# Patient Record
Sex: FEMALE | Race: WHITE | NOT HISPANIC OR LATINO | Employment: OTHER | URBAN - METROPOLITAN AREA
[De-identification: names, ages, dates, MRNs, and addresses within clinical notes are randomized per-mention and may not be internally consistent; named-entity substitution may affect disease eponyms.]

---

## 2023-09-01 ENCOUNTER — HOSPITAL ENCOUNTER (EMERGENCY)
Facility: HOSPITAL | Age: 73
Discharge: HOME | End: 2023-09-01
Attending: EMERGENCY MEDICINE | Admitting: EMERGENCY MEDICINE
Payer: COMMERCIAL

## 2023-09-01 ENCOUNTER — APPOINTMENT (OUTPATIENT)
Dept: MRI IMAGING | Facility: HOSPITAL | Age: 73
End: 2023-09-01
Attending: EMERGENCY MEDICINE
Payer: COMMERCIAL

## 2023-09-01 ENCOUNTER — APPOINTMENT (OUTPATIENT)
Dept: CT IMAGING | Facility: HOSPITAL | Age: 73
End: 2023-09-01
Attending: EMERGENCY MEDICINE
Payer: COMMERCIAL

## 2023-09-01 VITALS
TEMPERATURE: 98.3 F | WEIGHT: 108 LBS | HEIGHT: 63 IN | HEART RATE: 74 BPM | SYSTOLIC BLOOD PRESSURE: 143 MMHG | RESPIRATION RATE: 15 BRPM | DIASTOLIC BLOOD PRESSURE: 65 MMHG | BODY MASS INDEX: 19.14 KG/M2 | OXYGEN SATURATION: 98 %

## 2023-09-01 DIAGNOSIS — R68.84 JAW PAIN: ICD-10-CM

## 2023-09-01 DIAGNOSIS — R94.31 ABNORMAL QT INTERVAL PRESENT ON ELECTROCARDIOGRAM: ICD-10-CM

## 2023-09-01 DIAGNOSIS — M89.9 FRONTAL SKULL LESION: ICD-10-CM

## 2023-09-01 DIAGNOSIS — R55 NEAR SYNCOPE: ICD-10-CM

## 2023-09-01 LAB
ALBUMIN SERPL BCG-MCNC: 4.3 G/DL (ref 3.5–5.2)
ALBUMIN UR-MCNC: NEGATIVE MG/DL
ALP SERPL-CCNC: 75 U/L (ref 35–104)
ALT SERPL W P-5'-P-CCNC: 10 U/L (ref 0–50)
ANION GAP SERPL CALCULATED.3IONS-SCNC: 12 MMOL/L (ref 7–15)
APPEARANCE UR: ABNORMAL
AST SERPL W P-5'-P-CCNC: 33 U/L (ref 0–45)
BASOPHILS # BLD AUTO: 0 10E3/UL (ref 0–0.2)
BASOPHILS NFR BLD AUTO: 1 %
BILIRUB SERPL-MCNC: 0.4 MG/DL
BILIRUB UR QL STRIP: NEGATIVE
BUN SERPL-MCNC: 17.7 MG/DL (ref 8–23)
CALCIUM SERPL-MCNC: 10.1 MG/DL (ref 8.8–10.2)
CHLORIDE SERPL-SCNC: 100 MMOL/L (ref 98–107)
COLOR UR AUTO: ABNORMAL
CREAT SERPL-MCNC: 0.79 MG/DL (ref 0.51–0.95)
DEPRECATED HCO3 PLAS-SCNC: 26 MMOL/L (ref 22–29)
EOSINOPHIL # BLD AUTO: 0.1 10E3/UL (ref 0–0.7)
EOSINOPHIL NFR BLD AUTO: 2 %
ERYTHROCYTE [DISTWIDTH] IN BLOOD BY AUTOMATED COUNT: 12.4 % (ref 10–15)
GFR SERPL CREATININE-BSD FRML MDRD: 78 ML/MIN/1.73M2
GLUCOSE SERPL-MCNC: 132 MG/DL (ref 70–99)
GLUCOSE UR STRIP-MCNC: NEGATIVE MG/DL
HCT VFR BLD AUTO: 43.5 % (ref 35–47)
HGB BLD-MCNC: 14.2 G/DL (ref 11.7–15.7)
HGB UR QL STRIP: NEGATIVE
HOLD SPECIMEN: NORMAL
HYALINE CASTS: 10 /LPF
IMM GRANULOCYTES # BLD: 0 10E3/UL
IMM GRANULOCYTES NFR BLD: 0 %
KETONES UR STRIP-MCNC: NEGATIVE MG/DL
LEUKOCYTE ESTERASE UR QL STRIP: NEGATIVE
LYMPHOCYTES # BLD AUTO: 1 10E3/UL (ref 0.8–5.3)
LYMPHOCYTES NFR BLD AUTO: 15 %
MCH RBC QN AUTO: 31.1 PG (ref 26.5–33)
MCHC RBC AUTO-ENTMCNC: 32.6 G/DL (ref 31.5–36.5)
MCV RBC AUTO: 95 FL (ref 78–100)
MONOCYTES # BLD AUTO: 0.4 10E3/UL (ref 0–1.3)
MONOCYTES NFR BLD AUTO: 7 %
MUCOUS THREADS #/AREA URNS LPF: PRESENT /LPF
NEUTROPHILS # BLD AUTO: 4.9 10E3/UL (ref 1.6–8.3)
NEUTROPHILS NFR BLD AUTO: 75 %
NITRATE UR QL: NEGATIVE
NRBC # BLD AUTO: 0 10E3/UL
NRBC BLD AUTO-RTO: 0 /100
PH UR STRIP: 7.5 [PH] (ref 5–7)
PLATELET # BLD AUTO: 307 10E3/UL (ref 150–450)
POTASSIUM SERPL-SCNC: 4.4 MMOL/L (ref 3.4–5.3)
PROT SERPL-MCNC: 7 G/DL (ref 6.4–8.3)
RBC # BLD AUTO: 4.56 10E6/UL (ref 3.8–5.2)
RBC URINE: 3 /HPF
SODIUM SERPL-SCNC: 138 MMOL/L (ref 136–145)
SP GR UR STRIP: 1.02 (ref 1–1.03)
TROPONIN T SERPL HS-MCNC: 14 NG/L
TROPONIN T SERPL HS-MCNC: 17 NG/L
UROBILINOGEN UR STRIP-MCNC: <2 MG/DL
WBC # BLD AUTO: 6.4 10E3/UL (ref 4–11)
WBC URINE: 3 /HPF

## 2023-09-01 PROCEDURE — G1010 CDSM STANSON: HCPCS

## 2023-09-01 PROCEDURE — A9585 GADOBUTROL INJECTION: HCPCS | Mod: JZ | Performed by: EMERGENCY MEDICINE

## 2023-09-01 PROCEDURE — 70553 MRI BRAIN STEM W/O & W/DYE: CPT | Mod: MG

## 2023-09-01 PROCEDURE — 99285 EMERGENCY DEPT VISIT HI MDM: CPT | Mod: 25

## 2023-09-01 PROCEDURE — 70496 CT ANGIOGRAPHY HEAD: CPT | Mod: ME

## 2023-09-01 PROCEDURE — 81001 URINALYSIS AUTO W/SCOPE: CPT | Performed by: EMERGENCY MEDICINE

## 2023-09-01 PROCEDURE — 80053 COMPREHEN METABOLIC PANEL: CPT | Performed by: EMERGENCY MEDICINE

## 2023-09-01 PROCEDURE — 255N000002 HC RX 255 OP 636: Mod: JZ | Performed by: EMERGENCY MEDICINE

## 2023-09-01 PROCEDURE — 93005 ELECTROCARDIOGRAM TRACING: CPT | Performed by: STUDENT IN AN ORGANIZED HEALTH CARE EDUCATION/TRAINING PROGRAM

## 2023-09-01 PROCEDURE — 93005 ELECTROCARDIOGRAM TRACING: CPT | Performed by: EMERGENCY MEDICINE

## 2023-09-01 PROCEDURE — 70498 CT ANGIOGRAPHY NECK: CPT | Mod: ME

## 2023-09-01 PROCEDURE — 85025 COMPLETE CBC W/AUTO DIFF WBC: CPT | Performed by: EMERGENCY MEDICINE

## 2023-09-01 PROCEDURE — 36415 COLL VENOUS BLD VENIPUNCTURE: CPT | Performed by: EMERGENCY MEDICINE

## 2023-09-01 PROCEDURE — 84484 ASSAY OF TROPONIN QUANT: CPT | Performed by: EMERGENCY MEDICINE

## 2023-09-01 PROCEDURE — 250N000011 HC RX IP 250 OP 636: Mod: JZ | Performed by: EMERGENCY MEDICINE

## 2023-09-01 RX ORDER — IOPAMIDOL 755 MG/ML
75 INJECTION, SOLUTION INTRAVASCULAR ONCE
Status: COMPLETED | OUTPATIENT
Start: 2023-09-01 | End: 2023-09-01

## 2023-09-01 RX ORDER — GADOBUTROL 604.72 MG/ML
5 INJECTION INTRAVENOUS ONCE
Status: COMPLETED | OUTPATIENT
Start: 2023-09-01 | End: 2023-09-01

## 2023-09-01 RX ADMIN — IOPAMIDOL 75 ML: 755 INJECTION, SOLUTION INTRAVENOUS at 14:28

## 2023-09-01 RX ADMIN — GADOBUTROL 5 ML: 604.72 INJECTION INTRAVENOUS at 18:15

## 2023-09-01 ASSESSMENT — ACTIVITIES OF DAILY LIVING (ADL)
ADLS_ACUITY_SCORE: 35

## 2023-09-01 NOTE — DISCHARGE INSTRUCTIONS
Do not take any medications that prolong your QTc, ask pharmacist before starting any new prescription medication.  Expect a call from cardiology clinic, you need to be seen as soon as possible for further cardiology testing.  Negative heart attack workup today.  Negative CT of the head and CT of the arteries of head and neck today.    Follow-up with your Primary Care Provider regarding the incidental lesion seen on your MRI today.

## 2023-09-01 NOTE — ED NOTES
EMERGENCY DEPARTMENT SIGN OUT NOTE        ED COURSE AND MEDICAL DECISION MAKING  3:02 PM Patient was signed out to me by Dr Nikky Pickett.  7:01 PM Checked in on and updated patient. I discussed the plan for discharge with the patient, and patient is agreeable.  We discussed supportive cares at home and reasons for return to the ER including new or worsening symptoms - all questions and concerns addressed.  Patient to be discharged by RN.     In brief, Venecia Meehan is a 73 year old female who presented with jaw pain. She had finished a bike ride earlier today and 15 minutes later she began to have jaw pain, nausea and diaphoresis with significant lightheadedness. Symptoms lasted for about 30 minutes and then spontaneously resolved.     At time of sign out, disposition was pending a delta troponin and CTA head / neck imaging.    Head CT demonstrated:  1.  No acute intracranial hemorrhage, extra-axial fluid collection, or mass effect.  2.  Small probable chronic infarct in the left cerebellar hemisphere.  3.  Small non-specific hypodense lesion in the left lentiform nucleus, which may represent a dilated perivascular space or age-indeterminate infarct (potentially chronic).  4.  Brain atrophy and presumed chronic small vessel ischemic changes, as described.    CTA head with no high grade proximal arterial stenosis, aneurysm, or high flow vascular malformation identified.     CTA neck demonstrated:  1.  No high grade stenosis or occlusion of the cervical carotid or vertebral arteries.  2.  Moderate to severe focal atherosclerotic stenosis at the origin of the aberrant right subclavian artery.  3.  Subtle segmental luminal irregularity involving the V3 segment of the left vertebral artery. Slight fusiform dilation and tortuosity of the distal cervical right ICA. Non-specific findings, but possibility of fibromuscular dysplasia is not excluded.    Given head CT findings of age-indeterminate infarct, MRI brain performed  and demonstrated:  1.  Mild age-related changes without acute intracranial abnormality.  2.  Small nonspecific enhancing right frontal calvarial lesion without convincing CT correlate.    Delta troponin now normal (14, down-trending from 17).    Patient has remained asymptomatic throughout ED course and would like to discharge to home.    Patient advised to follow-up with [facility] (a referral was ordered) and her PCP. Return precautions provided. Patient stable throughout ED course.     FINAL IMPRESSION    1. Jaw pain    2. Near syncope    3. Abnormal QT interval present on electrocardiogram    4. Frontal skull lesion        ED MEDS  Medications   iopamidol (ISOVUE-370) solution 75 mL (75 mLs Intravenous $Given 9/1/23 6106)   gadobutrol (GADAVIST) injection 5 mL (5 mLs Intravenous $Given 9/1/23 8012)       LAB  Labs Ordered and Resulted from Time of ED Arrival to Time of ED Departure   COMPREHENSIVE METABOLIC PANEL - Abnormal       Result Value    Sodium 138      Potassium 4.4      Chloride 100      Carbon Dioxide (CO2) 26      Anion Gap 12      Urea Nitrogen 17.7      Creatinine 0.79      Calcium 10.1      Glucose 132 (*)     Alkaline Phosphatase 75      AST 33      ALT 10      Protein Total 7.0      Albumin 4.3      Bilirubin Total 0.4      GFR Estimate 78     TROPONIN T, HIGH SENSITIVITY - Abnormal    Troponin T, High Sensitivity 17 (*)    ROUTINE UA WITH MICROSCOPIC REFLEX TO CULTURE - Abnormal    Color Urine Light Yellow      Appearance Urine Turbid (*)     Glucose Urine Negative      Bilirubin Urine Negative      Ketones Urine Negative      Specific Gravity Urine 1.023      Blood Urine Negative      pH Urine 7.5 (*)     Protein Albumin Urine Negative      Urobilinogen Urine <2.0      Nitrite Urine Negative      Leukocyte Esterase Urine Negative      Mucus Urine Present (*)     RBC Urine 3 (*)     WBC Urine 3      Hyaline Casts Urine 10 (*)    TROPONIN T, HIGH SENSITIVITY - Normal     Troponin T, High Sensitivity 14     CBC WITH PLATELETS AND DIFFERENTIAL    WBC Count 6.4      RBC Count 4.56      Hemoglobin 14.2      Hematocrit 43.5      MCV 95      MCH 31.1      MCHC 32.6      RDW 12.4      Platelet Count 307      % Neutrophils 75      % Lymphocytes 15      % Monocytes 7      % Eosinophils 2      % Basophils 1      % Immature Granulocytes 0      NRBCs per 100 WBC 0      Absolute Neutrophils 4.9      Absolute Lymphocytes 1.0      Absolute Monocytes 0.4      Absolute Eosinophils 0.1      Absolute Basophils 0.0      Absolute Immature Granulocytes 0.0      Absolute NRBCs 0.0         EKG  Performed at 12:12:37  Sinus rhythm rate 102bpm, LAD, T waves inverted V1, biphasic in V2. No prior EKG to compare. Qtc 542ms, QS duration 80ms.     Dr. Koenig has independently reviewed and interpreted today's EKG, pending Cardiologist read    RADIOLOGY    MR Brain w/o & w Contrast   Final Result   IMPRESSION:   1.  Mild age-related changes without acute intracranial abnormality.   2.  Small nonspecific enhancing right frontal calvarial lesion without convincing CT correlate.      CTA Head Neck with Contrast   Final Result   IMPRESSION:    NONCONTRAST HEAD CT:   1.  No acute intracranial hemorrhage, extra-axial fluid collection, or mass effect.   2.  Small probable chronic infarct in the left cerebellar hemisphere.   3.  Small nonspecific hypodense lesion in the left lentiform nucleus, which may represent a dilated perivascular space or age-indeterminate infarct (potentially chronic).   4.  Brain atrophy and presumed chronic small vessel ischemic changes, as described.      HEAD CTA:    1.  No high grade proximal arterial stenosis, aneurysm, or high flow vascular malformation identified.   2.  Variant [place] anatomy as above.      NECK CTA:   1.  No high grade stenosis or occlusion of the cervical carotid or vertebral arteries.   2.  Moderate to severe focal atherosclerotic stenosis at the origin of the  aberrant right subclavian artery.   3.  Subtle segmental luminal irregularity involving the V3 segment of the left vertebral artery. Slight fusiform dilation and tortuosity of the distal cervical right ICA. These findings are nonspecific; the possibility of fibromuscular dysplasia is not    excluded.             I, Sherita Sumner, am serving as a scribe to document services personally performed by Jessica Banerjee, based on my observations and the provider's statements to me.  I, Jessica Banerjee, attest that Sherita Sumner is acting in a scribe capacity, has observed my performance of the services and has documented them in accordance with my direction.    Jessica Banerjee MD  [facility] EMERGENCY DEPARTMENT  [facility] [facility] [id number] [phone]       Jessica Banerjee MD  09/02/23 1042

## 2023-09-01 NOTE — ED TRIAGE NOTES
Patient comes to ED for evaluation after episode of bilateral jaw pain, nausea, and diaphoresis that started aprx 30 minutes ago. Patient denies pain currently. Stated had just completed a bike ride when symptoms occurred. Patient also stated she developed generalized weakness.  History of parkinson's.     Triage Assessment       Row Name 09/01/23 1210       Triage Assessment (Adult)    Airway WDL WDL       Respiratory WDL    Respiratory WDL WDL       Skin Circulation/Temperature WDL    Skin Circulation/Temperature WDL WDL       Cardiac WDL    Cardiac WDL WDL       Peripheral/Neurovascular WDL    Peripheral Neurovascular WDL WDL       Cognitive/Neuro/Behavioral WDL    Cognitive/Neuro/Behavioral WDL WDL

## 2023-09-01 NOTE — ED PROVIDER NOTES
EMERGENCY DEPARTMENT ENCOUNTER      NAME: Venecia Meehan  AGE: 73 year old female  YOB: 1950  MRN: [id number]  EVALUATION DATE & TIME: 2023 12:19 PM    PCP: No primary care provider on file.    ED PROVIDER: Nikky Pickett M.D.      Chief Complaint   Patient presents with    Jaw Pain    Nausea    diaphoresis       FINAL IMPRESSION:  1. Jaw pain    2. Near syncope    3. Abnormal QT interval present on electrocardiogram        ED COURSE & MEDICAL DECISION MAKIN:24 PM I met with the patient to gather history and to perform my initial exam. I discussed the plan for care while in the Emergency Department.         Pertinent Labs & Imaging studies reviewed. (See chart for details).    Medical Decision Making  Near syncope associated with jaw pain, nausea.  No chest pain. Rule out cardiac equivalent with troponin X 2. EKG shows prolonged Qtc at 542ms, no rx meds on board.  Rule out cervical and carotid a dissection with CT CTA head and neck.   Could be related to vasovagal vs orthostatic near syncope after exercising if workup is negative.  [facility] referral to cardiology for Qtc prolongation and near syncope.  Discharge home if negative 2nd troponin and EKG.      History:  Supplemental history from: Documented in chart, if applicable  External Record(s) reviewed: Documented in chart, if applicable.    Work Up:  Chart documentation includes differential considered and any EKGs or imaging independently interpreted by provider, where specified.  In additional to work up documented, I considered the following work up: Documented in chart, if applicable.    External consultation:  Discussion of management with another provider: Documented in chart, if applicable    Complicating factors:  Care impacted by chronic illness: N/A  Care affected by social determinants of health: N/A    Disposition considerations: Discharge. No recommendations on prescription strength medication(s). See documentation for any  additional details.        At the conclusion of the encounter I discussed the results of all of the tests and the disposition. The questions were answered. The patient or family acknowledged understanding and was agreeable with the care plan.      CRITICAL CARE:  N/A    HPI    Patient information was obtained from: the patient    Use of : N/A     Venecia Meehan is a 73 year old female who presents jaw pain, nausea, and diaphoresis.    Per Chart Review: patient has no on record medical history.    The patient finished a bike ride earlier today and 15 minutes afterwards she began to have jaw pain, nausea, cold sweats, and neck stiffness. She felt like she was going to pass out.  This lasted for about 30 minutes and are no longer present upon ED evaluation. She is uncertain if the jaw pain was only one side, thinks it was bilateral. Denies vision changes such as double or blurred vision during the episode. She notes a history of high cholesterol that she is unable to take medications for due to adverse reactions. She reports that she is normally healthy and regularly exercises, denies hx of chest pain on exertion. The patient denies vision changes and any other symptoms at this time.  Has been recently , has had increased stress due to this. Recently diagnosed with parkinson's disease in January. Recently moved to a new nursing home community.    REVIEW OF SYSTEMS  All other systems negative unless noted in HPI.    PAST MEDICAL HISTORY:  Past Medical History:   Diagnosis Date    Myopathy     Parkinson's disease (H)        PAST SURGICAL HISTORY:  No past surgical history on file.      CURRENT MEDICATIONS:    No current facility-administered medications for this encounter.     No current outpatient medications on file.         ALLERGIES:  Allergies   Allergen Reactions    Acesulfame Potassium Unknown    Additions Food Enhancer [Protein] Unknown    Bee Venom Unknown    Ciprofloxacin Unknown    Fire Ant  "Unknown    Latex Unknown    Statins Muscle Pain (Myalgia)    Sulfa Antibiotics Unknown    Penicillins Rash       FAMILY HISTORY:  No family history on file.    SOCIAL HISTORY:       VITALS:  Patient Vitals for the past 24 hrs:   BP Temp Temp src Pulse Resp SpO2 Height Weight   09/01/23 1445 (!) 143/65 -- -- 74 15 98 % -- --   09/01/23 1406 116/58 -- -- 61 24 95 % -- --   09/01/23 1336 101/55 -- -- 65 18 97 % -- --   09/01/23 1209 138/79 98.3  F (36.8  C) Oral 75 18 97 % 1.6 m (5' 3\") 49 kg (108 lb)       PHYSICAL EXAM    VITAL SIGNS: BP (!) 143/65   Pulse 74   Temp 98.3  F (36.8  C) (Oral)   Resp 15   Ht 1.6 m (5' 3\")   Wt 49 kg (108 lb)   SpO2 98%   BMI 19.13 kg/m    Physical Exam  Vitals and nursing note reviewed.   Constitutional:       General: She is not in acute distress.     Appearance: She is not toxic-appearing.   Eyes:      General: No scleral icterus.        Right eye: No discharge.         Left eye: No discharge.      Extraocular Movements: Extraocular movements intact.      Pupils: Pupils are equal, round, and reactive to light.      Comments: No nystagmus present on extraocular eye movement.   Cardiovascular:      Rate and Rhythm: Normal rate and regular rhythm.   Pulmonary:      Effort: Pulmonary effort is normal. No respiratory distress.      Breath sounds: Normal breath sounds.   Abdominal:      General: There is no distension.      Palpations: Abdomen is soft.      Tenderness: There is no abdominal tenderness.   Musculoskeletal:         General: No swelling or deformity.      Cervical back: Normal range of motion and neck supple. No rigidity.   Skin:     General: Skin is warm and dry.      Capillary Refill: Capillary refill takes less than 2 seconds.      Findings: No bruising or erythema.   Neurological:      General: No focal deficit present.      Mental Status: She is alert and oriented to person, place, and time. Mental status is at baseline.      Cranial Nerves: No cranial nerve " deficit.      Comments: Alert and oriented x3, finger-to-nose intact bilaterally, clear speech without dysarthria or aphasia, no facial droop, 5 out of 5 strength to upper and lower extremities bilaterally.  Sensation grossly intact to upper and lower extremities bilaterally.   Flat facies at baseline 2/2 parkinson's.   Psychiatric:         Mood and Affect: Mood normal.         Behavior: Behavior normal.         LABS  Results for orders placed or performed during the hospital encounter of 09/01/23 (from the past 24 hour(s))   [place] Draw    Narrative    The following orders were created for panel order [place] Draw.  Procedure                               Abnormality         Status                     ---------                               -----------         ------                     Extra Blue Top Tube[[id number]]                              Final result               Extra Red Top Tube[[id number]]                                                          Extra Green Top (Lithium...[[id number]]                      Final result               Extra Purple Top Tube[[id number]]                            Final result                 Please view results for these tests on the individual orders.   Extra Blue Top Tube   Result Value Ref Range    Hold Specimen JIC    Extra Green Top (Lithium Heparin) Tube   Result Value Ref Range    Hold Specimen JIC    Extra Purple Top Tube   Result Value Ref Range    Hold Specimen JIC    CBC with platelets + differential    Narrative    The following orders were created for panel order CBC with platelets + differential.  Procedure                               Abnormality         Status                     ---------                               -----------         ------                     CBC with platelets and d...[[id number]]                      Final result                 Please view results for these tests on the individual orders.   Comprehensive metabolic panel   Result Value  Ref Range    Sodium 138 136 - 145 mmol/L    Potassium 4.4 3.4 - 5.3 mmol/L    Chloride 100 98 - 107 mmol/L    Carbon Dioxide (CO2) 26 22 - 29 mmol/L    Anion Gap 12 7 - 15 mmol/L    Urea Nitrogen 17.7 8.0 - 23.0 mg/dL    Creatinine 0.79 0.51 - 0.95 mg/dL    Calcium 10.1 8.8 - 10.2 mg/dL    Glucose 132 (H) 70 - 99 mg/dL    Alkaline Phosphatase 75 35 - 104 U/L    AST 33 0 - 45 U/L    ALT 10 0 - 50 U/L    Protein Total 7.0 6.4 - 8.3 g/dL    Albumin 4.3 3.5 - 5.2 g/dL    Bilirubin Total 0.4 <=1.2 mg/dL    GFR Estimate 78 >60 mL/min/1.73m2   Troponin T, High Sensitivity (now)   Result Value Ref Range    Troponin T, High Sensitivity 17 (H) <=14 ng/L   CBC with platelets and differential   Result Value Ref Range    WBC Count 6.4 4.0 - 11.0 10e3/uL    RBC Count 4.56 3.80 - 5.20 10e6/uL    Hemoglobin 14.2 11.7 - 15.7 g/dL    Hematocrit 43.5 35.0 - 47.0 %    MCV 95 78 - 100 fL    MCH 31.1 26.5 - 33.0 pg    MCHC 32.6 31.5 - 36.5 g/dL    RDW 12.4 10.0 - 15.0 %    Platelet Count 307 150 - 450 10e3/uL    % Neutrophils 75 %    % Lymphocytes 15 %    % Monocytes 7 %    % Eosinophils 2 %    % Basophils 1 %    % Immature Granulocytes 0 %    NRBCs per 100 WBC 0 <1 /100    Absolute Neutrophils 4.9 1.6 - 8.3 10e3/uL    Absolute Lymphocytes 1.0 0.8 - 5.3 10e3/uL    Absolute Monocytes 0.4 0.0 - 1.3 10e3/uL    Absolute Eosinophils 0.1 0.0 - 0.7 10e3/uL    Absolute Basophils 0.0 0.0 - 0.2 10e3/uL    Absolute Immature Granulocytes 0.0 <=0.4 10e3/uL    Absolute NRBCs 0.0 10e3/uL         RADIOLOGY  CTA Head Neck with Contrast    (Results Pending)        I have independently reviewed the above image. See radiology report for detail.    EKG:    Performed at 12:12:37  Sinus rhythm rate 102bpm, LAD, T waves inverted V1, biphasic in V2. No prior EKG to compare. Qtc 542ms, QS duration 80ms.  EKG performed at 1500 sinus rhythm, rate 63bpm, T waves inverted V2, Qtc 437ms.     I have independently reviewed and interpreted today's EKG, pending  Cardiologist read    PROCEDURES:  N/A        MEDICATIONS GIVEN IN THE EMERGENCY:  Medications   iopamidol (ISOVUE-370) solution 75 mL (75 mLs Intravenous $Given 9/1/23 8423)       NEW PRESCRIPTIONS STARTED AT TODAY'S ER VISIT  New Prescriptions    No medications on file        I, Bean Vivas, am serving as a scribe to document services personally performed by Nikky Pickett MD, based on my observations and the provider's statements to me.  I, Nikky Pickett MD, attest that Bean Vivas is acting in a scribe capacity, has observed my performance of the services and has documented them in accordance with my direction.     Nikky Pickett MD  Emergency Medicine  [facility] EMERGENCY DEPARTMENT  [address] [place] [id number] [phone]  Dept: [phone]             Nikky Pickett MD  09/01/23 9664       Nikky Pickett MD  09/01/23 1154

## 2023-09-18 ENCOUNTER — OFFICE VISIT (OUTPATIENT)
Dept: CARDIOLOGY | Facility: CLINIC | Age: 73
End: 2023-09-18
Payer: COMMERCIAL

## 2023-09-18 VITALS
HEART RATE: 72 BPM | SYSTOLIC BLOOD PRESSURE: 124 MMHG | HEIGHT: 63 IN | BODY MASS INDEX: 19.67 KG/M2 | WEIGHT: 111 LBS | RESPIRATION RATE: 16 BRPM | DIASTOLIC BLOOD PRESSURE: 74 MMHG

## 2023-09-18 DIAGNOSIS — R79.89 TROPONIN LEVEL ELEVATED: Primary | ICD-10-CM

## 2023-09-18 DIAGNOSIS — R68.84 JAW PAIN: ICD-10-CM

## 2023-09-18 DIAGNOSIS — G20.A1 PARKINSON'S DISEASE (H): ICD-10-CM

## 2023-09-18 DIAGNOSIS — R55 NEAR SYNCOPE: ICD-10-CM

## 2023-09-18 PROBLEM — R94.31 ABNORMAL QT INTERVAL PRESENT ON ELECTROCARDIOGRAM: Status: ACTIVE | Noted: 2023-09-18

## 2023-09-18 PROCEDURE — 99204 OFFICE O/P NEW MOD 45 MIN: CPT | Performed by: INTERNAL MEDICINE

## 2023-09-18 RX ORDER — EPINEPHRINE 0.3 MG/.3ML
0.3 INJECTION SUBCUTANEOUS PRN
COMMUNITY
Start: 2023-09-05

## 2023-09-18 RX ORDER — BETAMETHASONE DIPROPIONATE 0.5 MG/G
LOTION TOPICAL DAILY
COMMUNITY
Start: 2023-01-19

## 2023-09-18 RX ORDER — IBUPROFEN 200 MG
200-400 TABLET ORAL EVERY 4 HOURS PRN
COMMUNITY

## 2023-09-18 RX ORDER — CARBIDOPA AND LEVODOPA 25; 100 MG/1; MG/1
TABLET ORAL
COMMUNITY
Start: 2023-05-10

## 2023-09-18 RX ORDER — VALACYCLOVIR HYDROCHLORIDE 500 MG/1
1 TABLET, FILM COATED ORAL DAILY
COMMUNITY
Start: 2023-08-07

## 2023-09-18 NOTE — PATIENT INSTRUCTIONS
Ms. Venecia Meehan,  It certainly was nice to meet you today.  Per our conversation you had a near black out spells.  This could represent heart issues or an abnormal heartbeat and the reason I am checking the ultrasound of the heart and the heart monitor and the stress test.  We will call you the results of these tests.    Flaco Burrell

## 2023-09-18 NOTE — PROGRESS NOTES
[facility] Office Consult     Assessment:   (R77.8) Troponin level elevated  (primary encounter diagnosis)  Comment: Initially 17 going down to 14, no acute ECG changes, patient had CT calcium scoring in 2021 with a left main score 1, LAD score of 126, circumflex 0 and right coronary artery 7.  Given vague symptoms we will arrange for stress echo and if abnormal we will pursue angiography.    (R55) Near syncope  Comment: Agree dehydration, doubt ischemic but stress test as above.  Doubt arrhythmogenic as she had an unremarkable heart monitor January 2022 but will repeat heart monitor for 30 days.  We will check echo looking for structural heart disease.    (R68.84) Jaw pain  Comment: Somewhat atypical, most likely secondary to lightheadedness, stress echo as above.    (G20) Parkinson's disease (H)  Comment: So noted and defer to primary neurology.  She tells me she had a stroke several years ago and defer to them as well, would suggest maybe aspirin.    Irritable bowel syndrome-so noted and defer to primary.     Plan:   1.  Echocardiogram looking for structural heart disease given murmur and address if abnormal.  2.  Stress echo given borderline troponin elevation as well as mild LAD disease and address if abnormal.  3.  30-day event monitor looking for arrhythmia and address if abnormal.  4.  Follow-up as needed.    History of Present Illness:   Thank you for asking the [facility] team to see Venecia Meehan a 73 year old  female  in consultation  to evaluate near syncopal spell and troponin elevation.   Patient tells me she has been under a fair amount of stress at home given the death of her  approximately 3 to 4 months ago leaving without a well.  She states, on 1 September she was out for half an hour bike ride, she was then standing talking to a neighbor and became all of a sudden lightheaded and had left arm and jaw discomfort.  This finally passed after about half an hour  and she was sitting in a car and needed to hold onto her walker not to blackout.  She drove herself to the emergency department where troponin was initially 17, down to 14, ECG was unremarkable and she is sent to the rapid access clinic.  She states she has never had anything like this before nor has she had anything subsequently and does not have effort related angina, jaw discomfort, arm discomfort or shortness of breath.    Past Medical History:     Past Medical History:   Diagnosis Date    Myopathy     Parkinson's disease (H)  Borderline pure hypercholesterolemia      Past history is negative for cancer, tuberculosis, diabetes mellitus, myocardial infarction,  rheumatic fever, hypertension, cerebrovascular accident, chronic kidney disease, peptic ulcer disease, chronic obstructive pulmonary disease, or thyroid disorder.    Past Surgical History:     Past Surgical History:   Procedure Laterality Date    APPENDECTOMY       SECTION      TONSILLECTOMY       Family History:   Family history positive for stenting and coronary artery bypass grafting starting in her brother at the age of 40.    Social History:   She is recently , lives alone independently in senior housing, reports that she has never smoked. She has never used smokeless tobacco. She reports current alcohol use. She reports that she does not use drugs.  She used to work as an . The primary care physician is Zainab Hartmann    Meds:   Scheduled Meds:  Current Outpatient Medications   Medication Sig Dispense Refill    aspirin 81 MG EC tablet [ASPIRIN 81 MG EC TABLET] Take 81 mg by mouth daily.      betamethasone dipropionate (DIPROSONE) 0.05 % external lotion Apply topically daily APPLY TO SCALP.      bismuth subsalicylate (PEPTO BISMOL) 262 MG/15ML suspension Take 15 mLs by mouth every 6 hours as needed for indigestion      calcium carbonate 400 mg Chew [CALCIUM CARBONATE 400 MG CHEW] Chew 500 mg daily.       carbidopa-levodopa (SINEMET)  MG tablet 1/2 tab at 5a-10a-3p for 3w then increase to 1 tab, 3x/d      cetirizine (ZYRTEC) 10 MG tablet [CETIRIZINE (ZYRTEC) 10 MG TABLET] Take 10 mg by mouth daily.      EPINEPHrine (ANY BX GENERIC EQUIV) 0.3 MG/0.3ML injection 2-pack Inject 0.3 mg into the muscle as needed for anaphylaxis      fluticasone (FLONASE) 50 mcg/actuation nasal spray [FLUTICASONE (FLONASE) 50 MCG/ACTUATION NASAL SPRAY] 1 spray into each nostril daily.      ibuprofen (ADVIL/MOTRIN) 200 MG tablet Take 200-400 mg by mouth every 4 hours as needed for moderate pain      levothyroxine (SYNTHROID, LEVOTHROID) 75 MCG tablet [LEVOTHYROXINE (SYNTHROID, LEVOTHROID) 75 MCG TABLET] Take 75 mcg by mouth daily.      lisinopril (PRINIVIL,ZESTRIL) 5 MG tablet [LISINOPRIL (PRINIVIL,ZESTRIL) 5 MG TABLET] Take 5 mg by mouth daily.      magnesium 200 mg Tab [MAGNESIUM 200 MG TAB] Take 400 mg by mouth daily.      methylsulfonylmethane (MSM) 1,000 mg cap [METHYLSULFONYLMETHANE (MSM) 1,000 MG CAP] Take 1 capsule by mouth daily.      multivitamin (ONE A DAY) per tablet [MULTIVITAMIN (ONE A DAY) PER TABLET] Take 1 tablet by mouth daily.      naproxen (NAPROSYN) 250 MG tablet [NAPROXEN (NAPROSYN) 250 MG TABLET] Take 250 mg by mouth 2 (two) times a day with meals.      polyethylene glycol (MIRALAX) 17 gram packet [POLYETHYLENE GLYCOL (MIRALAX) 17 GRAM PACKET] Take 17 g by mouth daily.      sodium chloride (OCEAN) 0.65 % nasal spray [SODIUM CHLORIDE (OCEAN) 0.65 % NASAL SPRAY] 1 spray into each nostril as needed for congestion.      traMADol (ULTRAM) 50 mg tablet [TRAMADOL (ULTRAM) 50 MG TABLET] Take 50 mg by mouth every 6 (six) hours as needed for pain.      triamcinolone (NASACORT) 55 mcg nasal inhaler [TRIAMCINOLONE (NASACORT) 55 MCG NASAL INHALER] 2 sprays into each nostril daily.      valACYclovir (VALTREX) 500 MG tablet Take 1 tablet by mouth daily         PRN Meds:.    Allergies:   Acesulfame [acesulfame potassium],  "Additions food enhancer [protein], Aspartame, Bee venom, Cefdinir, Chlorine, Ciprofloxacin, Colestipol, Ezetimibe, Fire ant, Latex, Other environmental allergy, Sorbitol, Statins, Statins-hmg-coa reductase inhibitors [statins], Sulfa antibiotics, Vicodin [hydrocodone-acetaminophen], Metronidazole, Penicillins, and Penicillins    Objective:      Physical Exam  50.3 kg (111 lb)  1.6 m (5' 3\")  Body mass index is 19.66 kg/m .  /74 (BP Location: Right arm, Patient Position: Sitting, Cuff Size: Adult Regular)   Pulse 72   Resp 16   Ht 1.6 m (5' 3\")   Wt 50.3 kg (111 lb)   BMI 19.66 kg/m      General Appearance:   Alert, cooperative and in no acute distress.   HEENT:  No scleral icterus; the mucous membranes were pink and moist.   Neck: JVP normal. No thyromegaly. No HJR   Chest: The spine was straight. The chest was symmetric.   Lungs:   Respirations unlabored; the lungs are clear to auscultation.   Cardiovascular:   S1 and S2 without murmur, clicks or rubs. Brachial, radial, carotid and posterior tibial pulses are intact and symetrical.  No carotid bruits noted   Abdomen:  No organomegaly, masses, bruits, or tenderness. Bowels sounds are present   Extremities: No cyanosis, clubbing, or edema.   Skin: No xanthelasma.   Neurologic: Mood and affect are appropriate.         Lab Reviewed Personally by myself  Lab Results   Component Value Date     09/01/2023    CO2 26 09/01/2023    BUN 17.7 09/01/2023     Lab Results   Component Value Date    WBC 6.4 09/01/2023    HGB 14.2 09/01/2023    HCT 43.5 09/01/2023    MCV 95 09/01/2023     09/01/2023     No results found for: CHOL, TRIG, HDL, LDLDIRECT  No results found for: BNP    ECG personally reviewed by myself shows sinus rhythm, leftward axis, no acute changes.       Review of Systems:     Review of Systems:   Enc Vitals  BP: 124/74  Pulse: 72  Resp: 16  Weight: 50.3 kg (111 lb)  Height: 160 cm (5' 3\")                                          "

## 2023-09-18 NOTE — LETTER
9/18/2023    EMERITA AMES CNP  No address on file    RE: Venecia Meehan       Dear Colleague,     I had the pleasure of seeing Venecia Meehan in the [facility].    [facility] Office Consult     Assessment:   (R77.8) Troponin level elevated  (primary encounter diagnosis)  Comment: Initially 17 going down to 14, no acute ECG changes, patient had CT calcium scoring in 2021 with a left main score 1, LAD score of 126, circumflex 0 and right coronary artery 7.  Given vague symptoms we will arrange for stress echo and if abnormal we will pursue angiography.    (R55) Near syncope  Comment: Agree dehydration, doubt ischemic but stress test as above.  Doubt arrhythmogenic as she had an unremarkable heart monitor January 2022 but will repeat heart monitor for 30 days.  We will check echo looking for structural heart disease.    (R68.84) Jaw pain  Comment: Somewhat atypical, most likely secondary to lightheadedness, stress echo as above.    (G20) Parkinson's disease (H)  Comment: So noted and defer to primary neurology.  She tells me she had a stroke several years ago and defer to them as well, would suggest maybe aspirin.    Irritable bowel syndrome-so noted and defer to primary.     Plan:   1.  Echocardiogram looking for structural heart disease given murmur and address if abnormal.  2.  Stress echo given borderline troponin elevation as well as mild LAD disease and address if abnormal.  3.  30-day event monitor looking for arrhythmia and address if abnormal.  4.  Follow-up as needed.    History of Present Illness:   Thank you for asking the [facility] Care team to see Venecia Meehan a 73 year old  female  in consultation  to evaluate near syncopal spell and troponin elevation.   Patient tells me she has been under a fair amount of stress at home given the death of her  approximately 3 to 4 months ago leaving without a well.  She states, on 1 September she was out  for half an hour bike ride, she was then standing talking to a neighbor and became all of a sudden lightheaded and had left arm and jaw discomfort.  This finally passed after about half an hour and she was sitting in a car and needed to hold onto her walker not to blackout.  She drove herself to the emergency department where troponin was initially 17, down to 14, ECG was unremarkable and she is sent to the rapid access clinic.  She states she has never had anything like this before nor has she had anything subsequently and does not have effort related angina, jaw discomfort, arm discomfort or shortness of breath.    Past Medical History:     Past Medical History:   Diagnosis Date    Myopathy     Parkinson's disease (H)  Borderline pure hypercholesterolemia      Past history is negative for cancer, tuberculosis, diabetes mellitus, myocardial infarction,  rheumatic fever, hypertension, cerebrovascular accident, chronic kidney disease, peptic ulcer disease, chronic obstructive pulmonary disease, or thyroid disorder.    Past Surgical History:     Past Surgical History:   Procedure Laterality Date    APPENDECTOMY       SECTION      TONSILLECTOMY       Family History:   Family history positive for stenting and coronary artery bypass grafting starting in her brother at the age of 40.    Social History:   She is recently , lives alone independently in senior housing, reports that she has never smoked. She has never used smokeless tobacco. She reports current alcohol use. She reports that she does not use drugs.  She used to work as an . The primary care physician is Zainab Hartmann    Meds:   Scheduled Meds:  Current Outpatient Medications   Medication Sig Dispense Refill    aspirin 81 MG EC tablet [ASPIRIN 81 MG EC TABLET] Take 81 mg by mouth daily.      betamethasone dipropionate (DIPROSONE) 0.05 % external lotion Apply topically daily APPLY TO SCALP.      bismuth subsalicylate (PEPTO  BISMOL) 262 MG/15ML suspension Take 15 mLs by mouth every 6 hours as needed for indigestion      calcium carbonate 400 mg Chew [CALCIUM CARBONATE 400 MG CHEW] Chew 500 mg daily.      carbidopa-levodopa (SINEMET)  MG tablet 1/2 tab at 5a-10a-3p for 3w then increase to 1 tab, 3x/d      cetirizine (ZYRTEC) 10 MG tablet [CETIRIZINE (ZYRTEC) 10 MG TABLET] Take 10 mg by mouth daily.      EPINEPHrine (ANY BX GENERIC EQUIV) 0.3 MG/0.3ML injection 2-pack Inject 0.3 mg into the muscle as needed for anaphylaxis      fluticasone (FLONASE) 50 mcg/actuation nasal spray [FLUTICASONE (FLONASE) 50 MCG/ACTUATION NASAL SPRAY] 1 spray into each nostril daily.      ibuprofen (ADVIL/MOTRIN) 200 MG tablet Take 200-400 mg by mouth every 4 hours as needed for moderate pain      levothyroxine (SYNTHROID, LEVOTHROID) 75 MCG tablet [LEVOTHYROXINE (SYNTHROID, LEVOTHROID) 75 MCG TABLET] Take 75 mcg by mouth daily.      lisinopril (PRINIVIL,ZESTRIL) 5 MG tablet [LISINOPRIL (PRINIVIL,ZESTRIL) 5 MG TABLET] Take 5 mg by mouth daily.      magnesium 200 mg Tab [MAGNESIUM 200 MG TAB] Take 400 mg by mouth daily.      methylsulfonylmethane (MSM) 1,000 mg cap [METHYLSULFONYLMETHANE (MSM) 1,000 MG CAP] Take 1 capsule by mouth daily.      multivitamin (ONE A DAY) per tablet [MULTIVITAMIN (ONE A DAY) PER TABLET] Take 1 tablet by mouth daily.      naproxen (NAPROSYN) 250 MG tablet [NAPROXEN (NAPROSYN) 250 MG TABLET] Take 250 mg by mouth 2 (two) times a day with meals.      polyethylene glycol (MIRALAX) 17 gram packet [POLYETHYLENE GLYCOL (MIRALAX) 17 GRAM PACKET] Take 17 g by mouth daily.      sodium chloride (OCEAN) 0.65 % nasal spray [SODIUM CHLORIDE (OCEAN) 0.65 % NASAL SPRAY] 1 spray into each nostril as needed for congestion.      traMADol (ULTRAM) 50 mg tablet [TRAMADOL (ULTRAM) 50 MG TABLET] Take 50 mg by mouth every 6 (six) hours as needed for pain.      triamcinolone (NASACORT) 55 mcg nasal inhaler [TRIAMCINOLONE (NASACORT) 55 MCG NASAL  "INHALER] 2 sprays into each nostril daily.      valACYclovir (VALTREX) 500 MG tablet Take 1 tablet by mouth daily         PRN Meds:.    Allergies:   Acesulfame [acesulfame potassium], Additions food enhancer [protein], Aspartame, Bee venom, Cefdinir, Chlorine, Ciprofloxacin, Colestipol, Ezetimibe, Fire ant, Latex, Other environmental allergy, Sorbitol, Statins, Statins-hmg-coa reductase inhibitors [statins], Sulfa antibiotics, Vicodin [hydrocodone-acetaminophen], Metronidazole, Penicillins, and Penicillins    Objective:      Physical Exam  50.3 kg (111 lb)  1.6 m (5' 3\")  Body mass index is 19.66 kg/m .  /74 (BP Location: Right arm, Patient Position: Sitting, Cuff Size: Adult Regular)   Pulse 72   Resp 16   Ht 1.6 m (5' 3\")   Wt 50.3 kg (111 lb)   BMI 19.66 kg/m      General Appearance:   Alert, cooperative and in no acute distress.   HEENT:  No scleral icterus; the mucous membranes were pink and moist.   Neck: JVP normal. No thyromegaly. No HJR   Chest: The spine was straight. The chest was symmetric.   Lungs:   Respirations unlabored; the lungs are clear to auscultation.   Cardiovascular:   S1 and S2 without murmur, clicks or rubs. Brachial, radial, carotid and posterior tibial pulses are intact and symetrical.  No carotid bruits noted   Abdomen:  No organomegaly, masses, bruits, or tenderness. Bowels sounds are present   Extremities: No cyanosis, clubbing, or edema.   Skin: No xanthelasma.   Neurologic: Mood and affect are appropriate.         Lab Reviewed Personally by myself  Lab Results   Component Value Date     09/01/2023    CO2 26 09/01/2023    BUN 17.7 09/01/2023     Lab Results   Component Value Date    WBC 6.4 09/01/2023    HGB 14.2 09/01/2023    HCT 43.5 09/01/2023    MCV 95 09/01/2023     09/01/2023     No results found for: CHOL, TRIG, HDL, LDLDIRECT  No results found for: BNP    ECG personally reviewed by myself shows sinus rhythm, leftward axis, no acute changes.       Review " "of Systems:     Review of Systems:   Enc Vitals  BP: 124/74  Pulse: 72  Resp: 16  Weight: 50.3 kg (111 lb)  Height: 160 cm (5' 3\")              Thank you for allowing me to participate in the care of your patient.      Sincerely,     BELIA LAUREN MD     [facility]  cc:   Nikky Pickett MD  [facility]  [address] [place] [address]      "

## 2023-10-13 ENCOUNTER — HOSPITAL ENCOUNTER (OUTPATIENT)
Dept: CARDIOLOGY | Facility: HOSPITAL | Age: 73
Discharge: HOME | End: 2023-10-13
Attending: INTERNAL MEDICINE
Payer: COMMERCIAL

## 2023-10-13 DIAGNOSIS — R55 NEAR SYNCOPE: ICD-10-CM

## 2023-10-13 LAB — LVEF ECHO: NORMAL

## 2023-10-13 PROCEDURE — 93306 TTE W/DOPPLER COMPLETE: CPT | Mod: 26 | Performed by: INTERNAL MEDICINE

## 2023-10-13 PROCEDURE — 93270 REMOTE 30 DAY ECG REV/REPORT: CPT

## 2023-10-13 PROCEDURE — 93306 TTE W/DOPPLER COMPLETE: CPT

## 2023-10-16 ENCOUNTER — TELEPHONE (OUTPATIENT)
Dept: CARDIOLOGY | Facility: CLINIC | Age: 73
End: 2023-10-16
Payer: COMMERCIAL

## 2023-10-16 DIAGNOSIS — R93.1 ABNORMAL FINDING ON ECHOCARDIOGRAM: Primary | ICD-10-CM

## 2023-10-16 DIAGNOSIS — R79.89 TROPONIN LEVEL ELEVATED: ICD-10-CM

## 2023-10-16 DIAGNOSIS — R68.84 JAW PAIN: ICD-10-CM

## 2023-10-16 DIAGNOSIS — R55 NEAR SYNCOPE: ICD-10-CM

## 2023-10-16 NOTE — TELEPHONE ENCOUNTER
Msg rec'd 10-16-23 @ 1609:  Flaco Burrell MD Gorshe, Maureen, RN  Valium 5 mg, may repeat x1, orally half an hour to an hour before procedure and make sure they do not drive herself either way.  LF     ----- Message -----  From: Sbarina Alamo RN  Sent: 10/16/2023   3:57 PM CDT  To: Flaco Burrell MD  Subject: Rx                                              Would need verbal order / Rx for anti-anxiety med.  Thanks  mg    ----- Message -----  From: Flaco Burrell MD  Sent: 10/16/2023   3:48 PM CDT  To: Sabrina Alamo RN    If we think she has a structural abnormality then cardiac MRI with sedation is the way to go please.  LF

## 2023-10-16 NOTE — TELEPHONE ENCOUNTER
Phone call to patient - informed her of Dr. Burrell's recommendations after update from nuc nurse winston swain of stress echo - patient verbalized understanding, denied claustrophobia but does have Parkinsons that only affects her Rt hand - patient would be open to taking anti-anxiety med pre test if recommended and understands that she would need a .    Reassured patient that update would be forwarded to Dr. Burrell to address need for pre cardMRI anti-anxiety Rx.  mg

## 2023-10-16 NOTE — TELEPHONE ENCOUNTER
Please review update and address need for anti-anxiety med prior to cardMRI due to Parkinsons.  mg

## 2023-10-16 NOTE — TELEPHONE ENCOUNTER
----- Message from Flaco Burrell MD sent at 10/13/2023  4:57 PM CDT -----  Regarding: FW: stress echo  Can we set her up for cardiac mri, I agree no stress echo.  LF  ----- Message -----  From: Vy Collins RN  Sent: 10/13/2023  10:02 AM CDT  To: Flaco Burrell MD  Subject: stress echo                                      Hello Venecia Anderson came in for her echo and stress echo today. In your absence, we reached out to Dr. Wallace (our NI doc today), as she had hyperdynamic function and mid-cavity gradient of 30mmHg at rest noted on echo. He elected to cancel the stress echo today based on these findings and will defer to you on the next step. Thanks,     Elis Collins

## 2023-10-16 NOTE — TELEPHONE ENCOUNTER
Return call to patient - informed her of Dr. Burrell's recommendations - patient stated she had been napping when initially called today and did not understand full conversation.    Reviewed Dr. Burrell's recommendations for cardMRI and prompt for anti-anxiety med due to Parkinsons and need to lay still.    Patient stated she has had MRI's in the past which have not required oral sedation and does not feel she needs it now - patient provided sched # to arrange cardMRI if she does not receive call first and agreed to call back if she changes her mind on Valium Rx.  mg

## 2023-10-21 ENCOUNTER — HEALTH MAINTENANCE LETTER (OUTPATIENT)
Age: 73
End: 2023-10-21

## 2023-10-23 NOTE — TELEPHONE ENCOUNTER
Rec'd call from patient stating she had not rec'd call from scheduling to arrange cardMRI - call transf to central scheduling.  mg

## 2023-11-13 PROCEDURE — 93272 ECG/REVIEW INTERPRET ONLY: CPT | Performed by: INTERNAL MEDICINE

## 2023-12-15 ENCOUNTER — HOSPITAL ENCOUNTER (OUTPATIENT)
Dept: MRI IMAGING | Facility: HOSPITAL | Age: 73
Discharge: HOME | End: 2023-12-15
Attending: INTERNAL MEDICINE
Payer: COMMERCIAL

## 2023-12-15 DIAGNOSIS — R79.89 TROPONIN LEVEL ELEVATED: ICD-10-CM

## 2023-12-15 DIAGNOSIS — R55 NEAR SYNCOPE: ICD-10-CM

## 2023-12-15 DIAGNOSIS — R93.1 ABNORMAL FINDING ON ECHOCARDIOGRAM: ICD-10-CM

## 2023-12-15 DIAGNOSIS — R68.84 JAW PAIN: ICD-10-CM

## 2023-12-15 PROCEDURE — 75561 CARDIAC MRI FOR MORPH W/DYE: CPT

## 2023-12-15 PROCEDURE — 255N000002 HC RX 255 OP 636: Mod: JZ | Performed by: INTERNAL MEDICINE

## 2023-12-15 PROCEDURE — A9585 GADOBUTROL INJECTION: HCPCS | Mod: JZ | Performed by: INTERNAL MEDICINE

## 2023-12-15 PROCEDURE — 999N000122 MR MYOCARDIUM  OVERREAD

## 2023-12-15 PROCEDURE — 75561 CARDIAC MRI FOR MORPH W/DYE: CPT | Mod: 26 | Performed by: INTERNAL MEDICINE

## 2023-12-15 RX ORDER — GADOBUTROL 604.72 MG/ML
11 INJECTION INTRAVENOUS ONCE
Status: COMPLETED | OUTPATIENT
Start: 2023-12-15 | End: 2023-12-15

## 2023-12-15 RX ADMIN — GADOBUTROL 11 ML: 604.72 INJECTION INTRAVENOUS at 09:06

## 2023-12-22 ENCOUNTER — TELEPHONE (OUTPATIENT)
Dept: CARDIOLOGY | Facility: CLINIC | Age: 73
End: 2023-12-22
Payer: COMMERCIAL

## 2023-12-22 NOTE — TELEPHONE ENCOUNTER
[facility] Call Center    Phone Message    May a detailed message be left on voicemail: yes     Reason for Call: Other: Patient called in wanting to know if she needs to continue taking 81 mg aspirin, or change her diet, or foods. Please call patient back for further discussion.        Action Taken: Other: cardiology    Travel Screening: Not Applicable    Thank you!  [facility]

## 2023-12-26 NOTE — TELEPHONE ENCOUNTER
Noted message. See 12/16 result letter. Pt called in to arrange follow up with [facility], which is in March. Since this is booked out a bit, she had some questions regarding her 81 mg aspirin tablet and activity level.     Venecia states that she was put on aspirin 81 mg awhile ago. She wonders if it is still necessary given her recent cardiac MRI results. Will present question to [facility] as requested but writer discussed her CT calcium score results from 2021, which her score was 134 and perhaps she was instructed to take it due to this. She also inquires about any activity restriction due to her cardiac MRI results. She has parkinson's and exercises with biking and lifting about an hour a day. She has a membership to the [employer]. Will route. -[facility]            Dr. Burrell,  Pt called in to make a follow up visit with you based on result letter/cardiac MRI results. She wonders if she has any activity restrictions ( 1 hour of exercise daily presently)  and if she should still take a daily baby aspirin.   Thanks,  Pedro

## 2023-12-27 NOTE — TELEPHONE ENCOUNTER
Left a detailed message with patient and stated that this would be released to [employer] Messenger and also direct line provided for call back. -[facility]               ----- Message -----  From: Flaco Burrell MD  Sent: 12/26/2023   2:48 PM CST  To: iKana Lara RN    Given mild coronary artery disease it is not unreasonable to continue aspirin, maybe 81 mg 3 times a week.  In terms of exercise, yes she should try to exercise but not to overdo it and certainly to stay well-hydrated.  No rush to follow-up with me unless she cannot wait until March or April.  LF

## 2024-04-01 ENCOUNTER — OFFICE VISIT (OUTPATIENT)
Dept: CARDIOLOGY | Facility: CLINIC | Age: 74
End: 2024-04-01
Payer: COMMERCIAL

## 2024-04-01 VITALS
SYSTOLIC BLOOD PRESSURE: 134 MMHG | BODY MASS INDEX: 19.13 KG/M2 | DIASTOLIC BLOOD PRESSURE: 67 MMHG | RESPIRATION RATE: 14 BRPM | HEART RATE: 74 BPM | WEIGHT: 108 LBS

## 2024-04-01 DIAGNOSIS — E03.4 HYPOTHYROIDISM DUE TO ACQUIRED ATROPHY OF THYROID: ICD-10-CM

## 2024-04-01 DIAGNOSIS — G20.A1 PARKINSON'S DISEASE WITHOUT DYSKINESIA OR FLUCTUATING MANIFESTATIONS (H): ICD-10-CM

## 2024-04-01 DIAGNOSIS — E78.00 PURE HYPERCHOLESTEROLEMIA: ICD-10-CM

## 2024-04-01 DIAGNOSIS — I10 BENIGN ESSENTIAL HYPERTENSION: ICD-10-CM

## 2024-04-01 DIAGNOSIS — I42.2 HYPERTROPHIC CARDIOMYOPATHY (H): Primary | ICD-10-CM

## 2024-04-01 DIAGNOSIS — I25.83 CORONARY ARTERIOSCLEROSIS DUE TO LIPID RICH PLAQUE: ICD-10-CM

## 2024-04-01 PROCEDURE — 99214 OFFICE O/P EST MOD 30 MIN: CPT | Performed by: INTERNAL MEDICINE

## 2024-04-01 PROCEDURE — G2211 COMPLEX E/M VISIT ADD ON: HCPCS | Performed by: INTERNAL MEDICINE

## 2024-04-01 NOTE — LETTER
4/1/2024    EMERITA AMES [facility]  [address],   [place], [place], [address]    RE: Venecia Meehan       Dear Colleague,     I had the pleasure of seeing Venecia Meehan in the [facility].      [facility]  Heart Care Clinic Follow-up Note    Assessment & Plan        (I42.2) Hypertrophic cardiomyopathy (H)  (primary encounter diagnosis)  Comment: Based on screening echo during stress echo she was noted to have hypertrophic cardiomyopathy prompting MRI.  This showed mid anterior septal and basal anterior wall increasing 1.6 cm with posterior wall of 0.6 cm.  There is no biventricular outflow tract obstruction, no systolic anterior motion, and delayed gadolinium is minimal.  Event monitor showed no significant atrial fibrillation, no sustained tachyarrhythmias, and she has no family history of sudden death although she had 1 episode of syncope most likely secondary to dehydration.  At this time I have asked her to stay well-hydrated, try to solute type beverages, will arrange for genetic testing for family members.  Would consider switching to a beta-blocker but given lack of symptoms at this point in time and multiple medication intolerances very hesitant to start a beta-blocker should she develop symptoms we will switch her to metoprolol from lisinopril.    (I25.10,  I25.83) Coronary arteriosclerosis due to lipid rich plaque  Comment: Over the years she has had numerous CTAs most recently in 2021 showing left main calcium score 1, left anterior sinus 4.26, circumflex score of 0, right coronary score of 7 and PDA score of 0.  Given her chest pain and borderline troponin elevation we will arrange for coronary CTA.  Will also try to give her lipids under better control.    (I10) Benign essential hypertension  Comment: As above controlled on lisinopril for like to switch this over to beta-blocker should she develop symptoms.  No high risk hypertrophic  findings.    (E78.00) Pure hypercholesterolemia  Comment: Total cholesterol 226 with an LDL of 136.  Intolerant of statin.  Will look to prior authorize inclisiran or PCSK9 inhibitors.    (G20.A1) Parkinson's disease without dyskinesia or fluctuating manifestations (H)  Comment: So noted on Sinemet.    (E03.4) Hypothyroidism due to acquired atrophy of thyroid  Comment: So noted levothyroxine.    Plan  1.  Arrange for coronary CTA given history of mild nonobstructive disease.  2.  Try to prior authorize bempadoic acid or inclisiran or PCSK9 inhibitor.  3.  Arrange for genetic testing.  4.  Follow-up me in about 6 months given all these issues.  5.  Switch lisinopril over to beta-blocker should she develop symptoms.    Subjective  CC: 74-year-old white female here for follow-up visit to go over test results.  She is not doing well, she tells me she had an accident where her garage door slammed down on her SUV.  She tells me she has hurt her right hand, needed to move to a less expensive storage facility.  Been having a lot to do with since the death of her .  She does tell me when she gets physically active her heart will speed up from 75 to about 85.  Otherwise there is some occasional orthostasis.  There is no recurrent syncope, major chest pains, significant shortness of breath, PND, orthopnea or peripheral edema.    Medications  Current Outpatient Medications   Medication Sig Dispense Refill    aspirin 81 MG EC tablet [ASPIRIN 81 MG EC TABLET] Take 81 mg by mouth daily.      betamethasone dipropionate (DIPROSONE) 0.05 % external lotion Apply topically daily APPLY TO SCALP.      carbidopa-levodopa (SINEMET)  MG tablet 1/2 tab at 5a-10a-3p for 3w then increase to 1 tab, 3x/d      EPINEPHrine (ANY BX GENERIC EQUIV) 0.3 MG/0.3ML injection 2-pack Inject 0.3 mg into the muscle as needed for anaphylaxis      ibuprofen (ADVIL/MOTRIN) 200 MG tablet Take 200-400 mg by mouth every 4 hours as needed for moderate  "pain      levothyroxine (SYNTHROID, LEVOTHROID) 75 MCG tablet [LEVOTHYROXINE (SYNTHROID, LEVOTHROID) 75 MCG TABLET] Take 75 mcg by mouth daily.      lisinopril (PRINIVIL,ZESTRIL) 5 MG tablet [LISINOPRIL (PRINIVIL,ZESTRIL) 5 MG TABLET] Take 5 mg by mouth daily.      magnesium 200 mg Tab [MAGNESIUM 200 MG TAB] Take 400 mg by mouth daily.      multivitamin (ONE A DAY) per tablet [MULTIVITAMIN (ONE A DAY) PER TABLET] Take 1 tablet by mouth daily.      valACYclovir (VALTREX) 500 MG tablet Take 1 tablet by mouth daily      traMADol (ULTRAM) 50 mg tablet [TRAMADOL (ULTRAM) 50 MG TABLET] Take 50 mg by mouth every 6 (six) hours as needed for pain.         Objective  /67 (BP Location: Right arm, Patient Position: Sitting, Cuff Size: Adult Regular)   Pulse 74   Resp 14   Wt 49 kg (108 lb)   BMI 19.13 kg/m      General Appearance:    Alert, cooperative, no distress, appears stated age   Head:    Normocephalic, without obvious abnormality, atraumatic   Throat:   Lips, mucosa, and tongue normal; teeth and gums normal   Neck:   Supple, symmetrical, trachea midline, no adenopathy;        thyroid:  No enlargement/tenderness/nodules; no carotid    bruit or JVD   Back:     Symmetric, no curvature, ROM normal, no CVA tenderness   Lungs:     Clear to auscultation bilaterally, respirations unlabored   Chest wall:    No tenderness or deformity   Heart:    Regular rate and rhythm, S1 and S2 normal, 2/6 systolic ejection murmur, intensifies with Valsalva, no rub   or gallop   Abdomen:     Soft, non-tender, bowel sounds active all four quadrants,     no masses, no organomegaly   Extremities:   Normal, atraumatic, no cyanosis or edema   Pulses:   2+ and symmetric all extremities   Skin:   Skin color, texture, turgor normal, no rashes or lesions     Results    Lab Results personally reviewed No results found for: \"CHOL\"  No results found for: \"HDL\"  No components found for: \"LDLCALC\"  No results found for: \"TRIG\"  Lab Results "   Component Value Date    WBC 6.4 09/01/2023    HGB 14.2 09/01/2023    HCT 43.5 09/01/2023     09/01/2023     Lab Results   Component Value Date    BUN 17.7 09/01/2023     09/01/2023    CO2 26 09/01/2023             Thank you for allowing me to participate in the care of your patient.      Sincerely,     BELIA LAUREN MD     [facility]  cc:   Kayden Fitzgerald MD  [address] [place] [address]

## 2024-04-01 NOTE — PROGRESS NOTES
[facility]  Heart Care Clinic Follow-up Note    Assessment & Plan        (I42.2) Hypertrophic cardiomyopathy (H)  (primary encounter diagnosis)  Comment: Based on screening echo during stress echo she was noted to have hypertrophic cardiomyopathy prompting MRI.  This showed mid anterior septal and basal anterior wall increasing 1.6 cm with posterior wall of 0.6 cm.  There is no biventricular outflow tract obstruction, no systolic anterior motion, and delayed gadolinium is minimal.  Event monitor showed no significant atrial fibrillation, no sustained tachyarrhythmias, and she has no family history of sudden death although she had 1 episode of syncope most likely secondary to dehydration.  At this time I have asked her to stay well-hydrated, try to solute type beverages, will arrange for genetic testing for family members.  Would consider switching to a beta-blocker but given lack of symptoms at this point in time and multiple medication intolerances very hesitant to start a beta-blocker should she develop symptoms we will switch her to metoprolol from lisinopril.    (I25.10,  I25.83) Coronary arteriosclerosis due to lipid rich plaque  Comment: Over the years she has had numerous CTAs most recently in 2021 showing left main calcium score 1, left anterior sinus 4.26, circumflex score of 0, right coronary score of 7 and PDA score of 0.  Given her chest pain and borderline troponin elevation we will arrange for coronary CTA.  Will also try to give her lipids under better control.    (I10) Benign essential hypertension  Comment: As above controlled on lisinopril for like to switch this over to beta-blocker should she develop symptoms.  No high risk hypertrophic findings.    (E78.00) Pure hypercholesterolemia  Comment: Total cholesterol 226 with an LDL of 136.  Intolerant of statin.  Will look to prior authorize inclisiran or PCSK9 inhibitors.    (G20.A1) Parkinson's disease without dyskinesia or fluctuating  manifestations (H)  Comment: So noted on Sinemet.    (E03.4) Hypothyroidism due to acquired atrophy of thyroid  Comment: So noted levothyroxine.    Plan  1.  Arrange for coronary CTA given history of mild nonobstructive disease.  2.  Try to prior authorize bempadoic acid or inclisiran or PCSK9 inhibitor.  3.  Arrange for genetic testing.  4.  Follow-up me in about 6 months given all these issues.  5.  Switch lisinopril over to beta-blocker should she develop symptoms.    Subjective  CC: 74-year-old white female here for follow-up visit to go over test results.  She is not doing well, she tells me she had an accident where her garage door slammed down on her SUV.  She tells me she has hurt her right hand, needed to move to a less expensive storage facility.  Been having a lot to do with since the death of her .  She does tell me when she gets physically active her heart will speed up from 75 to about 85.  Otherwise there is some occasional orthostasis.  There is no recurrent syncope, major chest pains, significant shortness of breath, PND, orthopnea or peripheral edema.    Medications  Current Outpatient Medications   Medication Sig Dispense Refill    aspirin 81 MG EC tablet [ASPIRIN 81 MG EC TABLET] Take 81 mg by mouth daily.      betamethasone dipropionate (DIPROSONE) 0.05 % external lotion Apply topically daily APPLY TO SCALP.      carbidopa-levodopa (SINEMET)  MG tablet 1/2 tab at 5a-10a-3p for 3w then increase to 1 tab, 3x/d      EPINEPHrine (ANY BX GENERIC EQUIV) 0.3 MG/0.3ML injection 2-pack Inject 0.3 mg into the muscle as needed for anaphylaxis      ibuprofen (ADVIL/MOTRIN) 200 MG tablet Take 200-400 mg by mouth every 4 hours as needed for moderate pain      levothyroxine (SYNTHROID, LEVOTHROID) 75 MCG tablet [LEVOTHYROXINE (SYNTHROID, LEVOTHROID) 75 MCG TABLET] Take 75 mcg by mouth daily.      lisinopril (PRINIVIL,ZESTRIL) 5 MG tablet [LISINOPRIL (PRINIVIL,ZESTRIL) 5 MG TABLET] Take 5 mg by  "mouth daily.      magnesium 200 mg Tab [MAGNESIUM 200 MG TAB] Take 400 mg by mouth daily.      multivitamin (ONE A DAY) per tablet [MULTIVITAMIN (ONE A DAY) PER TABLET] Take 1 tablet by mouth daily.      valACYclovir (VALTREX) 500 MG tablet Take 1 tablet by mouth daily      traMADol (ULTRAM) 50 mg tablet [TRAMADOL (ULTRAM) 50 MG TABLET] Take 50 mg by mouth every 6 (six) hours as needed for pain.         Objective  /67 (BP Location: Right arm, Patient Position: Sitting, Cuff Size: Adult Regular)   Pulse 74   Resp 14   Wt 49 kg (108 lb)   BMI 19.13 kg/m      General Appearance:    Alert, cooperative, no distress, appears stated age   Head:    Normocephalic, without obvious abnormality, atraumatic   Throat:   Lips, mucosa, and tongue normal; teeth and gums normal   Neck:   Supple, symmetrical, trachea midline, no adenopathy;        thyroid:  No enlargement/tenderness/nodules; no carotid    bruit or JVD   Back:     Symmetric, no curvature, ROM normal, no CVA tenderness   Lungs:     Clear to auscultation bilaterally, respirations unlabored   Chest wall:    No tenderness or deformity   Heart:    Regular rate and rhythm, S1 and S2 normal, 2/6 systolic ejection murmur, intensifies with Valsalva, no rub   or gallop   Abdomen:     Soft, non-tender, bowel sounds active all four quadrants,     no masses, no organomegaly   Extremities:   Normal, atraumatic, no cyanosis or edema   Pulses:   2+ and symmetric all extremities   Skin:   Skin color, texture, turgor normal, no rashes or lesions     Results    Lab Results personally reviewed No results found for: \"CHOL\"  No results found for: \"HDL\"  No components found for: \"LDLCALC\"  No results found for: \"TRIG\"  Lab Results   Component Value Date    WBC 6.4 09/01/2023    HGB 14.2 09/01/2023    HCT 43.5 09/01/2023     09/01/2023     Lab Results   Component Value Date    BUN 17.7 09/01/2023     09/01/2023    CO2 26 09/01/2023         "

## 2024-04-01 NOTE — PATIENT INSTRUCTIONS
Ms Venecia HOANG Meehan,  I enjoyed visiting with you again today.  I am glad to hear you are doing well from a heart standpoint.  Per our conversation we will look for blockages with a repeat CAT scan just like in 2013,2018,2021.  I will try to get you a lipid lowering drug that is covered.  Try to stay well hydrated with solute beverages or water with mineral drops.  We will look into genetic testing.  I will plan on seeing you 6 months.  Flaco Burrell

## 2024-04-02 ENCOUNTER — TELEPHONE (OUTPATIENT)
Dept: CARDIOLOGY | Facility: CLINIC | Age: 74
End: 2024-04-02
Payer: COMMERCIAL

## 2024-04-02 ENCOUNTER — TELEPHONE (OUTPATIENT)
Dept: CARDIOLOGY | Facility: CLINIC | Age: 74
End: 2024-04-02

## 2024-04-02 DIAGNOSIS — I42.2 HYPERTROPHIC CARDIOMYOPATHY (H): Primary | ICD-10-CM

## 2024-04-02 NOTE — TELEPHONE ENCOUNTER
----- Message from Flaco Burrell MD sent at 4/1/2024  8:57 AM CDT -----  Happy Monday.  This is a new follow-up from rapid access clinic, hypertrophic cardiomyopathy.  Can we get her genetic testing, she has 3 children.  Can we also try to prior authorize for either PCSK9 inhibitor, Inclisorin, or bempedoic acid?  LF

## 2024-04-02 NOTE — TELEPHONE ENCOUNTER
28 day supply test claims for PCSK9 drugs below:    Repatha Sureclick 140mg/ml  -$47 copay - covered without a PA      Praluent 75mg/ml  -PA needed, non-formulary

## 2024-04-02 NOTE — TELEPHONE ENCOUNTER
Noted message. Will route to PA team to see if they can determine which PCSK9 inihibitor is covered and start there. -[facility]

## 2024-04-02 NOTE — TELEPHONE ENCOUNTER
28 days supply test claims requested for the drugs below:  Repatha 140mg/ml, Praluent 75mg/ml    Repatha 140mg/ml  -$47 copay        Praluent 75mg/ml  -PA needed, nonformulary

## 2024-04-02 NOTE — TELEPHONE ENCOUNTER
Referral for genetic counselor referral placed. Will route to team after PCSK9 review. -[facility]

## 2024-04-03 NOTE — TELEPHONE ENCOUNTER
----- Message -----  From: Caryl Morse  Sent: 4/2/2024   2:33 PM CDT  To: Kiana Lara RN    Hello!  I was pretty sure Repatha is covered under [employer] D plans but wanted to make sure.  I ran test claims on both Repatha and Praluent.  Repatha sureclick 140mg/ml  is covered without a PA, the copay isn't too bad either at $47 for 28 day supply.  Praluent 75mg/ml needs a PA but [employer] Part [employer] is pretty lenient.  If for some reason the Repatha has side effects or is not working they approve the Praluent pretty quickly.   If you have any questions let me know!  Caryl       ==noted. Will check with LBF and see if OK to start with Repatha. -[facility]

## 2024-04-05 NOTE — TELEPHONE ENCOUNTER
LM with Venecia to discuss. -[facility]           ----- Message -----  From: Flaco Burrell MD  Sent: 4/3/2024   3:41 PM CDT  To: Kiana Lara RN    Yes, please, 140 every 2 weeks but before going the extra step please connect with her and let her know it was approved and how did she feel about starting it?  LF  ----- Message -----  From: Kiana Lara RN  Sent: 4/3/2024   1:55 PM CDT  To: Flaco Burrell MD    ----- Message from Kiana Lara RN sent at 4/3/2024  1:55 PM CDT -----  Hi,  You had sent a message to see about getting her approved for either PCSK9, leqvio or bempedoic acid. I started with PCSK9 by running a test claim with PA team and it looks like Repatha is covered. OK to place order for this to start?  Thanks!  Pedro Salgado!  I was pretty sure Repatha is covered under [employer] plans but wanted to make sure.  I ran test claims on both Repatha and Praluent.  Repatha sureclick 140mg/ml  is covered without a PA, the copay isn't too bad either at $47 for 28 day supply.  Praluent 75mg/ml needs a PA but [employer] Part D is pretty lenient.  If for some reason the Repatha has side effects or is not working they approve the Praluent pretty quickly.   If you have any questions let me know!  Caryl

## 2024-04-12 ENCOUNTER — TELEPHONE (OUTPATIENT)
Dept: CARDIOLOGY | Facility: CLINIC | Age: 74
End: 2024-04-12
Payer: COMMERCIAL

## 2024-04-17 NOTE — TELEPHONE ENCOUNTER
BENM in regards to scheduling GC with Nikky Charlton.   [facility] ATTEMPTS if pt would like to scheduled call [phone], sent pt letter.

## 2024-04-30 ENCOUNTER — HOSPITAL ENCOUNTER (OUTPATIENT)
Dept: CT IMAGING | Facility: CLINIC | Age: 74
Discharge: HOME | End: 2024-04-30
Attending: INTERNAL MEDICINE | Admitting: INTERNAL MEDICINE
Payer: COMMERCIAL

## 2024-04-30 VITALS
BODY MASS INDEX: 19.14 KG/M2 | SYSTOLIC BLOOD PRESSURE: 116 MMHG | WEIGHT: 108 LBS | HEIGHT: 63 IN | DIASTOLIC BLOOD PRESSURE: 61 MMHG

## 2024-04-30 DIAGNOSIS — I25.83 CORONARY ARTERIOSCLEROSIS DUE TO LIPID RICH PLAQUE: ICD-10-CM

## 2024-04-30 LAB
BSA FOR ECHO PROCEDURE: 0 M2
CCTA ASCENDING AORTA: 4.1
CREAT BLD-MCNC: 0.8 MG/DL (ref 0.6–1.1)
EGFRCR SERPLBLD CKD-EPI 2021: >60 ML/MIN/1.73M2

## 2024-04-30 PROCEDURE — 75574 CT ANGIO HRT W/3D IMAGE: CPT

## 2024-04-30 PROCEDURE — 75574 CT ANGIO HRT W/3D IMAGE: CPT | Mod: 26 | Performed by: STUDENT IN AN ORGANIZED HEALTH CARE EDUCATION/TRAINING PROGRAM

## 2024-04-30 PROCEDURE — 250N000009 HC RX 250: Performed by: INTERNAL MEDICINE

## 2024-04-30 PROCEDURE — 250N000011 HC RX IP 250 OP 636: Performed by: INTERNAL MEDICINE

## 2024-04-30 PROCEDURE — 82565 ASSAY OF CREATININE: CPT

## 2024-04-30 PROCEDURE — 250N000013 HC RX MED GY IP 250 OP 250 PS 637: Performed by: INTERNAL MEDICINE

## 2024-04-30 RX ORDER — LIDOCAINE 40 MG/G
CREAM TOPICAL
Status: DISCONTINUED | OUTPATIENT
Start: 2024-04-30 | End: 2024-05-01 | Stop reason: HOSPADM

## 2024-04-30 RX ORDER — NITROGLYCERIN 0.4 MG/1
0.4 TABLET SUBLINGUAL ONCE
Status: COMPLETED | OUTPATIENT
Start: 2024-04-30 | End: 2024-04-30

## 2024-04-30 RX ORDER — METOPROLOL TARTRATE 1 MG/ML
5 INJECTION, SOLUTION INTRAVENOUS
Status: DISCONTINUED | OUTPATIENT
Start: 2024-04-30 | End: 2024-05-01 | Stop reason: HOSPADM

## 2024-04-30 RX ORDER — DILTIAZEM HYDROCHLORIDE 5 MG/ML
5 INJECTION INTRAVENOUS
Status: DISCONTINUED | OUTPATIENT
Start: 2024-04-30 | End: 2024-05-01 | Stop reason: HOSPADM

## 2024-04-30 RX ORDER — IOPAMIDOL 755 MG/ML
100 INJECTION, SOLUTION INTRAVASCULAR ONCE
Status: COMPLETED | OUTPATIENT
Start: 2024-04-30 | End: 2024-04-30

## 2024-04-30 RX ORDER — DILTIAZEM HYDROCHLORIDE 5 MG/ML
10 INJECTION INTRAVENOUS
Status: DISCONTINUED | OUTPATIENT
Start: 2024-04-30 | End: 2024-05-01 | Stop reason: HOSPADM

## 2024-04-30 RX ADMIN — NITROGLYCERIN 0.4 MG: 0.4 TABLET SUBLINGUAL at 07:02

## 2024-04-30 RX ADMIN — IOPAMIDOL 100 ML: 755 INJECTION, SOLUTION INTRAVENOUS at 07:11

## 2024-04-30 RX ADMIN — METOPROLOL TARTRATE 5 MG: 1 INJECTION, SOLUTION INTRAVENOUS at 07:06

## 2024-05-07 NOTE — TELEPHONE ENCOUNTER
3rd attempt to reach to discuss starting Repatha. Direct line provided for call back. -[facility]

## 2024-05-14 ENCOUNTER — VIRTUAL VISIT (OUTPATIENT)
Dept: CARDIOLOGY | Facility: CLINIC | Age: 74
End: 2024-05-14
Attending: GENETIC COUNSELOR, MS
Payer: COMMERCIAL

## 2024-05-14 VITALS — BODY MASS INDEX: 19.88 KG/M2 | WEIGHT: 108 LBS | HEIGHT: 62 IN

## 2024-05-14 DIAGNOSIS — I42.2 HYPERTROPHIC CARDIOMYOPATHY (H): ICD-10-CM

## 2024-05-14 PROCEDURE — 96040 HC GENETIC COUNSELING, EACH 30 MINUTES: CPT | Mod: 95 | Performed by: GENETIC COUNSELOR, MS

## 2024-05-14 ASSESSMENT — PAIN SCALES - GENERAL: PAINLEVEL: NO PAIN (0)

## 2024-05-14 NOTE — NURSING NOTE
Is the patient currently in the state of [place]? YES    Visit mode:VIDEO    If the visit is dropped, the patient can be reconnected by: VIDEO VISIT: Text to cell phone:   Telephone Information:   Mobile [phone]    and VIDEO VISIT: Send to e-mail at: [email or web address]    Will anyone else be joining the visit? NO  (If patient encounters technical issues they should call [phone])    How would you like to obtain your AVS? MyChart    Are changes needed to the allergy or medication list? No    Patient denies any changes since echeck-in completion and states all information entered during echeck-in remains accurate.    Are refills needed on medications prescribed by this physician? NA    Reason for visit: Consult    SHERRY YouF

## 2024-05-14 NOTE — LETTER
2024      RE: Venecia Meehan  [address]  [address]  [place] [address]       Dear Colleague,    Thank you for the opportunity to participate in the care of your patient, Venecia Meehan, at the [facility] [place] [facility]. Please see a copy of my visit note below.    Virtual Visit Details  Type of service:  Video Visit   Video Start Time: 1:35 pm  Video End Time:2:20 PM    Originating Location (pt. Location): Home  Distant Location (provider location):  On-site  Platform used for Video Visit: Bio[employer]    PROVIDER APPOINTMENT  Here is a copy of the progress note from your recent genetic counseling visit through the [facility] on Date: 2024.  Patient was seen through a virtual visit.    PROGRESS NOTE:Venecia was referred by Dr. Burrell for genetic counseling due to her recent diagnosis of hypertrophic cardiomyopathy (HCM).  I had the opportunity to talk with Venecia today to discuss the genetic component of HCM and testing options available to her .     MEDICAL HISTORY: Venecia reports that  was a very difficult year.  In 2023 she was diagnosed with Parkinsons disease.  Over the summer her  was diagnosed with metastasized cancer, had a stroke and .  In October she was diagnosed with COVID and then long COVID in November.  In September, she had an episode of chest pain, lightheadedness, and fainting which lead to a cardiac evaluation.  Echo in October showed LVEF >65% with mild LVH.  MRI in December showed LV wall measuring 1.6 cm with increase heart function (LVEF 75%).  These findings are consistent with hypertrophic cardiomyopathy.    History is also remarkable for increased cholesterol, mild coronary artery disease that has been followed for over 14 years, hypertension, myopathy, and a TIA in .    FAMILY HISTORY:A detailed family history was obtained during today's  consult.  Family history was significant for the following cardiac history:  Mother  suddenly at 69 years in her sleep.  She had history of high blood pressure and allergies.    5 maternal aunts/uncles  - some had heart issues but no details are known.  Maternal grandmother  50-60's with heart problems.  Grandfather  80's with dementia.  Father  83 with cancer.  He had colon cancer in his 40's and had hearing loss from job exposure.  4 paternal aunts/uncles  - most  from cancer but details are limited.  Paternal grandparents  50-60 with cancer.  Grandfather may have been <50 years.  Brother  at 77 years after hitting his head.  He had Parkinsons, which led to his fall.  He also had lung problems since 19 years and CAD requiring bypass (x4) and stents.   Brother  62 with cancer.  He had 6 different kinds in his lifetime.  Brother  at 5 years with measles.  He was born with profound disabilities.  Sister has high cholesterol.  Twin sons (43) both have history of kidney stones.  One has two children in good health.  Daughter has spine injury after abuse. She has one son in good health.  There is no additional history of cardiomyopathy, arrhythmias, heart attacks, fainting, sudden cardiac death, genetic conditions, or birth defects. (A copy of pedigree may be found under media tab).    DISCUSSION: Reviewed definition of hypertrophic cardiomyopathy (HCM). Explained that a good portion of HCM cases have a genetic component.     Reviewed autosomal dominant (AD) inheritance pattern most commonly associated with HCM, including the 50% risk for recurrence. Briefly reviewed autosomal recessive and X-linked inheritance. Explained that HCM gene mutations are associated with reduced penetrance and variable expressivity, meaning that individuals who carry a gene mutation may or may not get the disease and onset and severity can vary from one family member to the next. This  explains why you may not see cardiomyopathy in each generation of a family.     Currently over 30 genes have been found to be related to HCM. Genetic testing currently identifies mutations in about 50-60% of idiopathic cases. Reviewed capabilities, limitations, and logistics of testing.  DNA sample via saliva or blood is collected and sent to testing lab for evaluation of selected genes. The results could directly impact care and treatment.      Explained three possible outcomes of genetic testing including: positive identification of a mutation, no mutation identified, and identification of a variant of unknown significance (VUS). If a mutation is identified, presymptomatic testing would be available to at risk family members. If no mutation is identified, it does not rule out the possibility of a genetic component to this disease. Family members could still be at risk for developing the same condition. If a VUS is identified, it is unclear if the mutation is disease causing or just a normal variation. It may take time and possibly additional testing to determine the meaning of a VUS result.     Test results take approximately 2-4 weeks on average. Discussed cost of testing through commercial labs. Explained that the lab will work with insurance to determine coverage and contact patient if out of pocket costs are expected to exceed $100. If so, patient has the option to pay reported price, cancel testing or elect self pay pricing (typcially around $250).     Discussed pros and cons of genetic testing. Explained that results could determine the cause for HCM. If a mutation is identified, presymptomatic testing is available to all at risk relatives. Reviewed possible issues associated with presymptomatic testing including genetic discrimination, current laws to prevent discrimination (ie. PAM), insurance issues, and emotional and psychosocial outcomes of testing.     Recommend clinical evaluation for all first  degree relatives (parents, siblings, and children) of an affected individual regardless of decision to pursue genetic testing. Based on the Heart Failure Society of [place] Practice Guidelines (Angeles et al, 2018), clinical evaluation should be performed at least every 3 years, except yearly during puberty, and should include history, cardiac exam, ECHO, EKG, Holter monitoring, and exercise treadmill.    All questions answered at this time.     PLAN: Venecia elected to proceed with genetic testing.  Requisition and consent forms were completed and signed.  DNA will be collected via cheek swab sample and sent to [facility]  laboratory. I will contact patient when results are available.    TOTAL TIME SPENT IN COUNSELIN minutes    Nikky Charlton MS, [facility]  Licensed, Certified Genetic Counselor  [facility]       Please do not hesitate to contact me if you have any questions/concerns.     Sincerely,     Nikky Charlton GC

## 2024-05-14 NOTE — PROGRESS NOTES
Virtual Visit Details  Type of service:  Video Visit   Video Start Time: 1:35 pm  Video End Time:2:20 PM    Originating Location (pt. Location): Home  Distant Location (provider location):  On-site  Platform used for Video Visit: Michelle    PROVIDER APPOINTMENT  Here is a copy of the progress note from your recent genetic counseling visit through the [facility] on Date: 2024.  Patient was seen through a virtual visit.    PROGRESS NOTE:Venecia was referred by Dr. Burrell for genetic counseling due to her recent diagnosis of hypertrophic cardiomyopathy (HCM).  I had the opportunity to talk with Venecia today to discuss the genetic component of HCM and testing options available to her .     MEDICAL HISTORY: Venecia reports that  was a very difficult year.  In 2023 she was diagnosed with Parkinsons disease.  Over the summer her  was diagnosed with metastasized cancer, had a stroke and .  In October she was diagnosed with COVID and then long COVID in November.  In September, she had an episode of chest pain, lightheadedness, and fainting which lead to a cardiac evaluation.  Echo in October showed LVEF >65% with mild LVH.  MRI in December showed LV wall measuring 1.6 cm with increase heart function (LVEF 75%).  These findings are consistent with hypertrophic cardiomyopathy.    History is also remarkable for increased cholesterol, mild coronary artery disease that has been followed for over 14 years, hypertension, myopathy, and a TIA in .    FAMILY HISTORY:A detailed family history was obtained during today's consult.  Family history was significant for the following cardiac history:  Mother  suddenly at 69 years in her sleep.  She had history of high blood pressure and allergies.    5 maternal aunts/uncles  - some had heart issues but no details are known.  Maternal grandmother  50-60's with heart problems.  Grandfather  80's with  dementia.  Father  83 with cancer.  He had colon cancer in his 40's and had hearing loss from job exposure.  4 paternal aunts/uncles  - most  from cancer but details are limited.  Paternal grandparents  50-60 with cancer.  Grandfather may have been <50 years.  Brother  at 77 years after hitting his head.  He had Parkinsons, which led to his fall.  He also had lung problems since 19 years and CAD requiring bypass (x4) and stents.   Brother  62 with cancer.  He had 6 different kinds in his lifetime.  Brother  at 5 years with measles.  He was born with profound disabilities.  Sister has high cholesterol.  Twin sons (43) both have history of kidney stones.  One has two children in good health.  Daughter has spine injury after abuse. She has one son in good health.  There is no additional history of cardiomyopathy, arrhythmias, heart attacks, fainting, sudden cardiac death, genetic conditions, or birth defects. (A copy of pedigree may be found under media tab).    DISCUSSION: Reviewed definition of hypertrophic cardiomyopathy (HCM). Explained that a good portion of HCM cases have a genetic component.     Reviewed autosomal dominant (AD) inheritance pattern most commonly associated with HCM, including the 50% risk for recurrence. Briefly reviewed autosomal recessive and X-linked inheritance. Explained that HCM gene mutations are associated with reduced penetrance and variable expressivity, meaning that individuals who carry a gene mutation may or may not get the disease and onset and severity can vary from one family member to the next. This explains why you may not see cardiomyopathy in each generation of a family.     Currently over 30 genes have been found to be related to HCM. Genetic testing currently identifies mutations in about 50-60% of idiopathic cases. Reviewed capabilities, limitations, and logistics of testing.  DNA sample via saliva or blood is collected and sent to testing  lab for evaluation of selected genes. The results could directly impact care and treatment.      Explained three possible outcomes of genetic testing including: positive identification of a mutation, no mutation identified, and identification of a variant of unknown significance (VUS). If a mutation is identified, presymptomatic testing would be available to at risk family members. If no mutation is identified, it does not rule out the possibility of a genetic component to this disease. Family members could still be at risk for developing the same condition. If a VUS is identified, it is unclear if the mutation is disease causing or just a normal variation. It may take time and possibly additional testing to determine the meaning of a VUS result.     Test results take approximately 2-4 weeks on average. Discussed cost of testing through commercial labs. Explained that the lab will work with insurance to determine coverage and contact patient if out of pocket costs are expected to exceed $100. If so, patient has the option to pay reported price, cancel testing or elect self pay pricing (typcially around $250).     Discussed pros and cons of genetic testing. Explained that results could determine the cause for HCM. If a mutation is identified, presymptomatic testing is available to all at risk relatives. Reviewed possible issues associated with presymptomatic testing including genetic discrimination, current laws to prevent discrimination (ie. PAM), insurance issues, and emotional and psychosocial outcomes of testing.     Recommend clinical evaluation for all first degree relatives (parents, siblings, and children) of an affected individual regardless of decision to pursue genetic testing. Based on the Heart Failure Society of [place] Practice Guidelines (Angeles et al, 2018), clinical evaluation should be performed at least every 3 years, except yearly during puberty, and should include history, cardiac exam,  ECHO, EKG, Holter monitoring, and exercise treadmill.    All questions answered at this time.     PLAN: Venecia elected to proceed with genetic testing.  Requisition and consent forms were completed and signed.  DNA will be collected via cheek swab sample and sent to [facility]  laboratory. I will contact patient when results are available.    TOTAL TIME SPENT IN COUNSELIN minutes    Nikky Charlton MS, [facility] [place]  Licensed, Certified Genetic Counselor  [facility]

## 2024-05-14 NOTE — PATIENT INSTRUCTIONS
"Indication for Genetic Counseling:     Hypertrophic cardiomyopathy (HCM) is a condition that causes part of the heart muscle (the left ventricle) to become thick and enlarged (hypertrophy).  It is usually diagnosed by echocardiogram (ECHO) or cardiac magnetic resonance imaging (MRI).  When the heart becomes enlarged, it cannot pump blood as effectively, which can lead to symptoms such as shortness of breath, fatigue, chest pains, irregular heartbeat, fainting, stroke, cardiac arrest, and sudden cardiac death (SCD).  HCM can be caused by a variety of factors, such as chronic hypertension, a narrow aorta, athletic heart, or genetics.  There are at least 20 known genes that, when not working properly, can cause HCM.    Inheritance:   Humans have over 20,000 genes that instruct our bodies how to function.  We have two copies of each gene because we inherit one from our mother and one from our father.  In most cardiac cases with a genetic component, the condition is inherited in an autosomal dominant (AD) pattern.  This means that in order to have the condition, a person needs to inherit a mutation on one copy of a particular gene.  This mutation or pathogenic variant dominates the \"normal\" working copy of the gene.  When an affected individual has children, they can either pass on the \"normal\" copy of the gene or the mutation.  Therefore, children have a 50% chance of inheriting the mutation.  Other family members also have an increased risk but the specific risk depends on the degree of relationship.  Additional inheritance patterns can occur within families and may alter the risk of recurrence.     Testing Options:   Genetic testing is available to assess a panel of genes known to cause this condition.  This test reads through the DNA (sequencing) of these genes to look for spelling mistakes or mutations that could cause the condition.      There are three types of results you could receive from this test.     " -Positive result (mutation identified) - confirms diagnosis and provides an answer to why this happened.  In addition, identifying a mutation allows family members to have testing to determine their risk.     -Negative result (mutation not identified) - no genetic changes were identified.  This does not rule out a genetic cause for the condition as the genetic testing only identifies 50-60% of genetic causes for this condition.    -Variant of uncertain significance (VUS) - a genetic change was identified, but there is not enough information to determine whether it is disease-causing or normal human genetic variation.     Although genetic testing may identify a mutation, it cannot provide information about the severity of symptoms or the progression of disease.  We cannot predict age of onset or severity of symptoms due to reduced penetrance and variable expressivity.    Logistics:   Genetic testing involves collecting a sample of DNA, thru blood, saliva, or cheek cells.  The sample will be sent to a laboratory to extract the DNA and sequence the genes for mutations.  The laboratory will work with your insurance company to determine the out of pocket (OOP) cost and will notify you if the OOP cost is greater than $100.  Remember to ask the lab about financial assistance pricing and self pay options as well.  Sometimes those are much lower than insurance pricing.  When testing is initiated, results take about 2-4 weeks to return. I will contact you over the phone when results are available.     Genetic Information and Nondiscrimination Act:  The Genetic Information and Nondiscrimination Act of 2008 (PAM) is a federal law that protects individuals from genetic discrimination in health insurance and employment. Genetic discrimination is defined as the misuse of genetic information. This law does not address potential discrimination regarding life insurance or disability insurance.      This is especially relevant for at  risk individuals who are considering presymptomatic testing.    Screening Recommendations:  Recommend clinical evaluation for all first degree relatives (parents, siblings, and children) of an affected individual regardless of decision to pursue genetic testing.  Clinical evaluation should be performed at least every 3 years, except yearly during puberty, and should include history, cardiac exam, ECHO, EKG, Holter monitoring, and exercise treadmill.    Resources:  Hypertrophic Cardiomyopathy Association - 4hcm.org  Children's Cardiomyopathy Foundation - childrenscardiomyopathy.org  [place] Cardiomyopathy Association - cardiomyopathy.org  HCM Care phone lanny    General   American Heart Association - americanheart.org  Genetics Home Reference - ghr.Osteoplastics.nih.gov  Genetic Information and Nondiscrimination Act - ginahelp.org    Contact Information:  Nikky Charlton MS  Licensed Genetic Counselor  [facility]  [facility]    Office:  [phone]  Appointments:  [phone]  Fax: [phone]  Email: [email or web address]

## 2024-05-29 ENCOUNTER — TELEPHONE (OUTPATIENT)
Dept: CARDIOLOGY | Facility: CLINIC | Age: 74
End: 2024-05-29
Payer: COMMERCIAL

## 2024-06-04 NOTE — TELEPHONE ENCOUNTER
Spoke with Venecia today to review results of genetic testing. She underwent genetic testing for the comprehensive cardiomyopathy panel. DNA was collected via cheek swab on May 19, 2024 and sent to [facility]  laboratory.   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 This mutation is well documented and has been reported multiple times in association with hypertrophic cardiomyopathy (HCM). These results indicate that Melanies HCM is caused by a mutation in the MYH7 gene.   Given the autosomal dominant inheritance of the MYH7 mutation, each of Venecia's children and siblings has a 50% risk of inheriting this mutation. Genetic testing is available to determine who is at risk for developing HCM. However, age of onset and severity cannot be determined.   If testing is elected, [facility] is currently offering free genetic testing for any and all family members who are tested within 150 days of Venecia's results.  Provided our scheduling number and [facility] search tool for her family.  Reviewed recommendation for ongoing cardiac screening, including clinical exam, EKG, and ECHO for all first degree relatives. Testing may also include heart monitor, stress testing, and MRI.  Clinical cardiac screening should be performed regardless of decision to pursue genetic testing.     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  A variant of unknown significance (VUS) means that there is a genetic change in which we do not have enough information to determine if it is disease causing or not. Labs review published data, functional studies, presences in population databases, similarity to normal sequence, and computer prediction models.    DMD gene (c. 5213 C>T) The DMD gene is known to be associated with X-linked muscular dystrophy and dilated cardiomyopathy. This particular variant creates an amino acid with similar properties.  This variant is not  found in population databases and has not been previously reported in individual(s) with DMD related conditions.  Computer models predict this variant will is unlikely to impact protein function.  Therefore, based on the current information it is unclear if this VUS is associated with disease or not.    TNNI3K gene (c. 1389 T>G) The TNNI3K gene is known to be associated with cardiac conduction disease, with or without dilated cardiomyopathy. This particular variant creates an amino acid with similar properties.  This variant is not found in population databases and has not been previously reported in individual(s) with TNNI3K related conditions.  Computer models predict this variant will be tolerated and is unlikely to impact protein function.  Therefore, based on the current information it is unclear if this VUS is associated with disease or not.   No additional testing in patient or family members is recommended for either of these variants.  A summary letter and copy of the results will be sent to patient. All questions answered at this time.  Nikky Charlton MS, [facility] [place]  Licensed, Certified Genetic Counselor  [facility]  [facility]

## 2024-06-07 NOTE — TELEPHONE ENCOUNTER
Dayna Burrell MD Caswell, Mallory J RN  Caller: Unspecified (1 week ago)  Apparently she knows about this mutation, I would recommend if she has any concern about her offspring that they be screened for these mutations.  If they have any mutations then they would need echoes, if they do not have the mutations and they could then stand down.  LF            ==noted. This was discussed with patient by genetic counselor. -yary

## 2024-06-13 NOTE — TELEPHONE ENCOUNTER
No call back ever received. Letter mailed asking to call the clinic if willing to move forward with Repatha or cholesterol-lowering medication. -[facility]

## 2024-07-19 ENCOUNTER — TELEPHONE (OUTPATIENT)
Dept: CARDIOLOGY | Facility: CLINIC | Age: 74
End: 2024-07-19
Payer: COMMERCIAL

## 2024-07-19 NOTE — TELEPHONE ENCOUNTER
Sharon calls in today to discuss cholesterol management/Repatha as recommended by Dr. Burrell. Of note, writer started calling and leaving her messages for patient in April and never received call backs. Writer sent a letter in June requesting a call back if she is willing to move forward and patient has called back today after reviewing it.     She reports that she had long-covid and has had issues with her joints since she was last in and this has caused her significant fatigue and difficulties. She would prefer not to take a shot, if possible. She is intolerant of statins and zetia. She wonders about any other oral medication that Dr. Burrell could recommend. Will route for review but did mention that injection was recommended due to lack of options for oral meds.             Dr. Burrell,  See your 4/1/24 office visit note then my telephone encounter and CCTA result letter. Sharon called back today. 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 Any other oral medication recommendations? Intolerant of statins and zetia.. she knows options are limited.  Thanks,  Pedro

## 2024-07-24 NOTE — TELEPHONE ENCOUNTER
Dayna Burrell MD   to Me   LF    7/19/24  4:03 PM  Would consider utilizing a nexletol, not sure if it would be covered however.  LF        ==called patient and updated on above. She has ongoing issues with long- covid and upcoming surgery.  She will look into this when she is recovered from this. -[facility]

## 2024-08-14 ENCOUNTER — TELEPHONE (OUTPATIENT)
Dept: CARDIOLOGY | Facility: CLINIC | Age: 74
End: 2024-08-14
Payer: COMMERCIAL

## 2024-08-14 NOTE — TELEPHONE ENCOUNTER
[facility] Call Center    Phone Message    May a detailed message be left on voicemail: yes     Reason for Call: Other: pt is wondering if provider would submit a cardiac clearance letter to [facility] for a surgery on 8/27/24. SAC can see provider saw pt in April 2024. Please call pt back to discuss.        Pt is unavailable today from 1pm-2pm. If main number is not answered pt did provide a secondary number as well: [phone]      Action Taken: Other: cardiology     Travel Screening: Not Applicable       Thank you!  [facility]

## 2024-08-15 ENCOUNTER — MYC MEDICAL ADVICE (OUTPATIENT)
Dept: CARDIOLOGY | Facility: CLINIC | Age: 74
End: 2024-08-15
Payer: COMMERCIAL

## 2024-08-15 NOTE — TELEPHONE ENCOUNTER
VIOLET to inquire what type of surgery she would be having so that this can be presented to Dr. Burrell. Will also send over hike. Clinic line provided for call back. -[facility] [place]

## 2024-08-15 NOTE — TELEPHONE ENCOUNTER
[facility] Call Center    Phone Message    May a detailed message be left on voicemail: yes     Reason for Call: Other: Patient is calling to update care team that she is having ULNAR DEVIATION surgery on 8/27/2024,please advise, thank you.     Action Taken: Other: cardiology    Travel Screening: Not Applicable     Date of Service:                                                                    \

## 2024-08-16 NOTE — TELEPHONE ENCOUNTER
Noted- pt will be having surgery to repair ulnar deviation with [facility] at the end of this month. Pt last seen by LBF 4/1/24- Pt had a reassuring CCTA at that time. Will route.  -[facility]            Venecia Anderson will be having surgery for ulnar deviation. She is requesting a cardiac risk assessment or statement from you. Would you be willing to provide or does she need to any testing prior? She had a CCTA this year- 4/30/24.   Thanks,  Mal

## 2024-08-16 NOTE — TELEPHONE ENCOUNTER
"\"----- Message -----  From: Dayna Burrell MD  Sent: 8/16/2024  12:37 PM CDT  To: Kiana Lara RN    As long as she is still asymptomatic, no significant chest pain, shortness of breath or lightheaded spells, no preop assessment needs to be done.  I would have her check with the surgeon however as well as anesthesiologist, if they request something more definitive then we should bring her in the rapid access clinic.  LF\"                ==noted- called patient and updated her that as long as she is asymptomatic- no preop visit with cardiology is needed per Dr. Burrell. She was stable in April with reassuring cardiac testing at that time. Venecia denies any cardiac symptoms. Encounter faxed to PMD + Surgery office. At patient request- 8/20 appt cancelled with Dr. Burrell.     PMD: Raquel FELDER [phone] fax    Surgeon: Dr. Karla Pretty [phone] fax  "

## 2024-10-29 ENCOUNTER — OFFICE VISIT (OUTPATIENT)
Dept: CARDIOLOGY | Facility: CLINIC | Age: 74
End: 2024-10-29
Attending: INTERNAL MEDICINE
Payer: COMMERCIAL

## 2024-10-29 ENCOUNTER — TELEPHONE (OUTPATIENT)
Dept: CARDIOLOGY | Facility: CLINIC | Age: 74
End: 2024-10-29

## 2024-10-29 VITALS — SYSTOLIC BLOOD PRESSURE: 110 MMHG | DIASTOLIC BLOOD PRESSURE: 68 MMHG | RESPIRATION RATE: 14 BRPM | HEART RATE: 72 BPM

## 2024-10-29 DIAGNOSIS — E78.00 PURE HYPERCHOLESTEROLEMIA: ICD-10-CM

## 2024-10-29 DIAGNOSIS — E03.4 HYPOTHYROIDISM DUE TO ACQUIRED ATROPHY OF THYROID: ICD-10-CM

## 2024-10-29 DIAGNOSIS — I10 BENIGN ESSENTIAL HYPERTENSION: ICD-10-CM

## 2024-10-29 DIAGNOSIS — I25.83 CORONARY ARTERIOSCLEROSIS DUE TO LIPID RICH PLAQUE: Primary | ICD-10-CM

## 2024-10-29 DIAGNOSIS — I42.2 HYPERTROPHIC CARDIOMYOPATHY (H): ICD-10-CM

## 2024-10-29 DIAGNOSIS — G20.A1 PARKINSON'S DISEASE WITHOUT DYSKINESIA OR FLUCTUATING MANIFESTATIONS (H): ICD-10-CM

## 2024-10-29 DIAGNOSIS — E78.00 PURE HYPERCHOLESTEROLEMIA: Primary | ICD-10-CM

## 2024-10-29 DIAGNOSIS — Z78.9 STATIN INTOLERANCE: ICD-10-CM

## 2024-10-29 PROCEDURE — 99214 OFFICE O/P EST MOD 30 MIN: CPT | Performed by: INTERNAL MEDICINE

## 2024-10-29 PROCEDURE — G2211 COMPLEX E/M VISIT ADD ON: HCPCS | Performed by: INTERNAL MEDICINE

## 2024-10-29 RX ORDER — KRILL/OM-3/DHA/EPA/PHOSPHO/AST 500MG-86MG
1 CAPSULE ORAL DAILY
COMMUNITY

## 2024-10-29 RX ORDER — TRIAMCINOLONE ACETONIDE 1 MG/G
OINTMENT TOPICAL
COMMUNITY
Start: 2024-10-24

## 2024-10-29 RX ORDER — HYDROXYCHLOROQUINE SULFATE 200 MG/1
1 TABLET, FILM COATED ORAL DAILY
COMMUNITY
Start: 2024-08-28 | End: 2025-10-10

## 2024-10-29 RX ORDER — FLUOCINOLONE ACETONIDE 0.11 MG/ML
OIL TOPICAL
COMMUNITY
Start: 2024-04-06

## 2024-10-29 RX ORDER — CYCLOBENZAPRINE HCL 5 MG
7.5 TABLET ORAL 3 TIMES DAILY PRN
COMMUNITY

## 2024-10-29 RX ORDER — MAGNESIUM OXIDE 400 MG/1
1 TABLET ORAL DAILY
COMMUNITY

## 2024-10-29 RX ORDER — LISINOPRIL 10 MG/1
10 TABLET ORAL DAILY
COMMUNITY
Start: 2024-08-02

## 2024-10-29 RX ORDER — MELOXICAM 7.5 MG/1
7.5 TABLET ORAL PRN
COMMUNITY
Start: 2024-08-05 | End: 2024-11-03

## 2024-10-29 RX ORDER — ASCORBIC ACID 1000 MG
1 TABLET ORAL DAILY
COMMUNITY

## 2024-10-29 RX ORDER — DESONIDE 0.5 MG/ML
LOTION TOPICAL 2 TIMES DAILY
COMMUNITY

## 2024-10-29 RX ORDER — UBIDECARENONE 100 MG
100 CAPSULE ORAL DAILY
COMMUNITY

## 2024-10-29 RX ORDER — POLYETHYLENE GLYCOL 3350 17 G/17G
POWDER, FOR SOLUTION ORAL DAILY
COMMUNITY
Start: 2024-09-15

## 2024-10-29 RX ORDER — HYDROCORTISONE 25 MG/G
OINTMENT TOPICAL
COMMUNITY
Start: 2024-10-24

## 2024-10-29 RX ORDER — ALBUTEROL SULFATE 90 UG/1
1-2 INHALANT RESPIRATORY (INHALATION) EVERY 4 HOURS PRN
COMMUNITY
Start: 2023-10-18

## 2024-10-29 NOTE — PATIENT INSTRUCTIONS
Ms Venecia HOANG Meehan,  I enjoyed visiting with you again today.  I am sorry to hear of the bad surgery and the Parkinson disease.  Per our conversation let us see if the every 6 month injection for cholesterol would be covered.  If blood pressures running lower call me at [phone].  I will plan on seeing you 1 year.  Flaco Burrell

## 2024-10-29 NOTE — LETTER
10/29/2024    EMERITA AMES CNP  No address on file    RE: Venecia Meehan       Dear Colleague,     I had the pleasure of seeing Venecia Meehan in the [facility].      [facility]  Heart Care Clinic Follow-up Note    Assessment & Plan        (I42.2) Hypertrophic cardiomyopathy (H)  (primary encounter diagnosis)  Comment: Based on screening echo during stress echo she was noted to have hypertrophic cardiomyopathy prompting MRI.  This showed mid anterior septal and basal anterior wall increasing 1.6 cm with posterior wall of 0.6 cm.  There is no biventricular outflow tract obstruction, no systolic anterior motion, and delayed gadolinium is minimal.  Event monitor showed no significant atrial fibrillation, no sustained tachyarrhythmias, and she has no family history of sudden death although she had 1 episode of syncope most likely secondary to dehydration.  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  Would consider switching to a beta-blocker but given lack of symptoms at this point in time and multiple medication intolerances very hesitant to start a beta-blocker should she develop symptoms we will switch her to metoprolol from lisinopril.     (I25.10,  I25.83) Coronary arteriosclerosis due to lipid rich plaque  Comment: Over the years she has had numerous CTAs most recently in 2021 showing left main calcium score 1, LAD 26, circumflex score of 0, right coronary score of 7 and PDA score of 0.  Given her chest pain performed coronary CTA showing no obstructive disease.    (I10) Benign essential hypertension  Comment: As above controlled on lisinopril for like to switch this over to beta-blocker should she develop symptoms.  No high risk hypertrophic findings.  Her primary increased her  lisinopril up to 10 mg and now she is slightly low and orthostatic and I defer to her primary to lower the dose back down.     (E78.00) Pure hypercholesterolemia  Comment: Total cholesterol 226 with an LDL of 136.  Intolerant of statin.  She would like to consider bempedoic acid but this is usually not covered, we try to prior authorize PCSK9 inhibitor and she was not really interested in pursuing this, we will try to prior authorize inclisiran.     (G20.A1) Parkinson's disease without dyskinesia or fluctuating manifestations (H)  Comment: So noted on Sinemet.  She said neurology tells her parkinsonism is not doing well.     (E03.4) Hypothyroidism due to acquired atrophy of thyroid  Comment: So noted levothyroxine.encounter diagnosis)    Plan  1.  I have asked to monitor blood pressures and if they remain low to let us know and we will decrease lisinopril dose.  2.  Try to prior authorize Inclisorin every 6 months with our clinic.  3.  Follow with me 1 year or sooner if needed.    The longitudinal plan of care for the diagnosis(es)/condition(s) as documented were addressed during this visit. Due to the added complexity in care, I will continue to support Venecia in the subsequent management and with ongoing continuity of care.     Subjective  CC: 74-year-old white female here for follow-up visit.  She is not doing well, she had ulnar deviation surgery which did not go well, due to this she tells me she was on pain medications and lost a fair amount of weight.  She tells me her appetite is not good and her diet is even worse, she cannot prepare good meals.  She is worried about her cholesterol being up.  She tells me her parkinsonism is not behaving, she denies any chest pains, palpitations, shortness breath, PND, orthopnea or peripheral edema.    Medications  Current Outpatient Medications   Medication Sig Dispense Refill     albuterol (PROAIR HFA/PROVENTIL HFA/VENTOLIN HFA) 108 (90 Base) MCG/ACT inhaler Inhale 1-2  puffs into the lungs every 4 hours as needed.       aspirin 81 MG EC tablet [ASPIRIN 81 MG EC TABLET] Take 81 mg by mouth daily.       betamethasone dipropionate (DIPROSONE) 0.05 % external lotion Apply topically daily APPLY TO SCALP.       carbidopa-levodopa (SINEMET)  MG tablet 1/2 tab at 5a-10a-3p for 3w then increase to 1 tab, 3x/d       co-enzyme Q-10 100 MG CAPS capsule Take 100 mg by mouth daily.       cyclobenzaprine (FLEXERIL) 5 MG tablet Take 7.5 mg by mouth 3 times daily as needed for muscle spasms.       desonide (DESOWEN) 0.05 % external lotion Apply topically 2 times daily.       EPINEPHrine (ANY BX GENERIC EQUIV) 0.3 MG/0.3ML injection 2-pack Inject 0.3 mg into the muscle as needed for anaphylaxis       fluocinolone acetonide (DERMA SMOOTHE/FS BODY) 0.01 % external oil Apply topically.       Glucosamine Sulfate 750 MG CAPS Take 1 capsule by mouth daily.       hydrocortisone 2.5 % ointment Apply topically.       hydroxychloroquine (PLAQUENIL) 200 MG tablet Take 1 tablet by mouth daily.       Krill Oil 500 MG CAPS Take 1 capsule by mouth daily.       levothyroxine (SYNTHROID, LEVOTHROID) 75 MCG tablet [LEVOTHYROXINE (SYNTHROID, LEVOTHROID) 75 MCG TABLET] Take 75 mcg by mouth daily.       lisinopril (ZESTRIL) 10 MG tablet Take 10 mg by mouth daily.       magnesium oxide (MAG-OX) 400 MG tablet Take 1 tablet by mouth daily.       meloxicam (MOBIC) 7.5 MG tablet Take 7.5 mg by mouth as needed.       multivitamin (ONE A DAY) per tablet [MULTIVITAMIN (ONE A DAY) PER TABLET] Take 1 tablet by mouth daily.       Plant Sterols and Stanols (CHOLESTOFF PLUS PO) Take 2 tablets by mouth daily.       polyethylene glycol (MIRALAX) 17 GM/Dose powder Take by mouth daily.       triamcinolone (KENALOG) 0.1 % external ointment Apply topically.         Objective  /68 (BP Location: Right arm, Patient Position: Sitting, Cuff Size: Adult Regular)   Pulse 72   Resp 14     General Appearance:    Alert, cooperative,  "no distress, appears stated age   Head:    Normocephalic, without obvious abnormality, atraumatic   Throat:   Lips, mucosa, and tongue normal; teeth and gums normal   Neck:   Supple, symmetrical, trachea midline, no adenopathy;        thyroid:  No enlargement/tenderness/nodules; no carotid    bruit or JVD   Back:     Symmetric, no curvature, ROM normal, no CVA tenderness   Lungs:     Clear to auscultation bilaterally, respirations unlabored   Chest wall:    No tenderness or deformity   Heart:    Regular rate and rhythm, S1 and S2 normal, 1-2/6 systolic ejection murmur, no rub   or gallop   Abdomen:     Soft, non-tender, bowel sounds active all four quadrants,     no masses, no organomegaly   Extremities:   Normal, atraumatic, no cyanosis or edema   Pulses:   2+ and symmetric all extremities   Skin:   Skin color, texture, turgor normal, no rashes or lesions     Results    Lab Results personally reviewed No results found for: \"CHOL\"  No results found for: \"HDL\"  No components found for: \"LDLCALC\"  No results found for: \"TRIG\"  Lab Results   Component Value Date    WBC 6.4 09/01/2023    HGB 14.2 09/01/2023    HCT 43.5 09/01/2023     09/01/2023     Lab Results   Component Value Date    BUN 17.7 09/01/2023     09/01/2023    CO2 26 09/01/2023             Thank you for allowing me to participate in the care of your patient.      Sincerely,     BELIA BURRELL MD     [facility]  cc:   Dayna Burrell MD  [address], [address]  [place] [address]      "

## 2024-10-29 NOTE — TELEPHONE ENCOUNTER
Patient seen in clinic by Dr. Burrell today for routine cardiology follow up. Pt was agreeable to trying for inclinsorin prior authorization after discussion with Dr. Burrell. Orders placed and will attempt for PA. Pt is intolerant of statins , reported muscle damage from statin use in the past. Repatha approved in the past but eventually declined by patient due to ongoing symptoms of long-covid. Will route to PA team. -[facility] [place]

## 2024-10-29 NOTE — PROGRESS NOTES
[facility]  Heart Care Clinic Follow-up Note    Assessment & Plan        (I42.2) Hypertrophic cardiomyopathy (H)  (primary encounter diagnosis)  Comment: Based on screening echo during stress echo she was noted to have hypertrophic cardiomyopathy prompting MRI.  This showed mid anterior septal and basal anterior wall increasing 1.6 cm with posterior wall of 0.6 cm.  There is no biventricular outflow tract obstruction, no systolic anterior motion, and delayed gadolinium is minimal.  Event monitor showed no significant atrial fibrillation, no sustained tachyarrhythmias, and she has no family history of sudden death although she had 1 episode of syncope most likely secondary to dehydration.  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  Would consider switching to a beta-blocker but given lack of symptoms at this point in time and multiple medication intolerances very hesitant to start a beta-blocker should she develop symptoms we will switch her to metoprolol from lisinopril.     (I25.10,  I25.83) Coronary arteriosclerosis due to lipid rich plaque  Comment: Over the years she has had numerous CTAs most recently in 2021 showing left main calcium score 1, LAD 26, circumflex score of 0, right coronary score of 7 and PDA score of 0.  Given her chest pain performed coronary CTA showing no obstructive disease.    (I10) Benign essential hypertension  Comment: As above controlled on lisinopril for like to switch this over to beta-blocker should she develop symptoms.  No high risk hypertrophic findings.  Her primary increased her lisinopril up to 10 mg and now she is slightly low and orthostatic and I defer to her primary to lower the dose back down.     (E78.00) Pure hypercholesterolemia  Comment: Total cholesterol 226  with an LDL of 136.  Intolerant of statin.  She would like to consider bempedoic acid but this is usually not covered, we try to prior authorize PCSK9 inhibitor and she was not really interested in pursuing this, we will try to prior authorize inclisiran.     (G20.A1) Parkinson's disease without dyskinesia or fluctuating manifestations (H)  Comment: So noted on Sinemet.  She said neurology tells her parkinsonism is not doing well.     (E03.4) Hypothyroidism due to acquired atrophy of thyroid  Comment: So noted levothyroxine.encounter diagnosis)    Plan  1.  I have asked to monitor blood pressures and if they remain low to let us know and we will decrease lisinopril dose.  2.  Try to prior authorize Inclisorin every 6 months with our clinic.  3.  Follow with me 1 year or sooner if needed.    The longitudinal plan of care for the diagnosis(es)/condition(s) as documented were addressed during this visit. Due to the added complexity in care, I will continue to support Venecia in the subsequent management and with ongoing continuity of care.     Subjective  CC: 74-year-old white female here for follow-up visit.  She is not doing well, she had ulnar deviation surgery which did not go well, due to this she tells me she was on pain medications and lost a fair amount of weight.  She tells me her appetite is not good and her diet is even worse, she cannot prepare good meals.  She is worried about her cholesterol being up.  She tells me her parkinsonism is not behaving, she denies any chest pains, palpitations, shortness breath, PND, orthopnea or peripheral edema.    Medications  Current Outpatient Medications   Medication Sig Dispense Refill    albuterol (PROAIR HFA/PROVENTIL HFA/VENTOLIN HFA) 108 (90 Base) MCG/ACT inhaler Inhale 1-2 puffs into the lungs every 4 hours as needed.      aspirin 81 MG EC tablet [ASPIRIN 81 MG EC TABLET] Take 81 mg by mouth daily.      betamethasone dipropionate (DIPROSONE) 0.05 % external lotion  Apply topically daily APPLY TO SCALP.      carbidopa-levodopa (SINEMET)  MG tablet 1/2 tab at 5a-10a-3p for 3w then increase to 1 tab, 3x/d      co-enzyme Q-10 100 MG CAPS capsule Take 100 mg by mouth daily.      cyclobenzaprine (FLEXERIL) 5 MG tablet Take 7.5 mg by mouth 3 times daily as needed for muscle spasms.      desonide (DESOWEN) 0.05 % external lotion Apply topically 2 times daily.      EPINEPHrine (ANY BX GENERIC EQUIV) 0.3 MG/0.3ML injection 2-pack Inject 0.3 mg into the muscle as needed for anaphylaxis      fluocinolone acetonide (DERMA SMOOTHE/FS BODY) 0.01 % external oil Apply topically.      Glucosamine Sulfate 750 MG CAPS Take 1 capsule by mouth daily.      hydrocortisone 2.5 % ointment Apply topically.      hydroxychloroquine (PLAQUENIL) 200 MG tablet Take 1 tablet by mouth daily.      Krill Oil 500 MG CAPS Take 1 capsule by mouth daily.      levothyroxine (SYNTHROID, LEVOTHROID) 75 MCG tablet [LEVOTHYROXINE (SYNTHROID, LEVOTHROID) 75 MCG TABLET] Take 75 mcg by mouth daily.      lisinopril (ZESTRIL) 10 MG tablet Take 10 mg by mouth daily.      magnesium oxide (MAG-OX) 400 MG tablet Take 1 tablet by mouth daily.      meloxicam (MOBIC) 7.5 MG tablet Take 7.5 mg by mouth as needed.      multivitamin (ONE A DAY) per tablet [MULTIVITAMIN (ONE A DAY) PER TABLET] Take 1 tablet by mouth daily.      Plant Sterols and Stanols (CHOLESTOFF PLUS PO) Take 2 tablets by mouth daily.      polyethylene glycol (MIRALAX) 17 GM/Dose powder Take by mouth daily.      triamcinolone (KENALOG) 0.1 % external ointment Apply topically.         Objective  /68 (BP Location: Right arm, Patient Position: Sitting, Cuff Size: Adult Regular)   Pulse 72   Resp 14     General Appearance:    Alert, cooperative, no distress, appears stated age   Head:    Normocephalic, without obvious abnormality, atraumatic   Throat:   Lips, mucosa, and tongue normal; teeth and gums normal   Neck:   Supple, symmetrical, trachea midline, no  "adenopathy;        thyroid:  No enlargement/tenderness/nodules; no carotid    bruit or JVD   Back:     Symmetric, no curvature, ROM normal, no CVA tenderness   Lungs:     Clear to auscultation bilaterally, respirations unlabored   Chest wall:    No tenderness or deformity   Heart:    Regular rate and rhythm, S1 and S2 normal, 1-2/6 systolic ejection murmur, no rub   or gallop   Abdomen:     Soft, non-tender, bowel sounds active all four quadrants,     no masses, no organomegaly   Extremities:   Normal, atraumatic, no cyanosis or edema   Pulses:   2+ and symmetric all extremities   Skin:   Skin color, texture, turgor normal, no rashes or lesions     Results    Lab Results personally reviewed No results found for: \"CHOL\"  No results found for: \"HDL\"  No components found for: \"LDLCALC\"  No results found for: \"TRIG\"  Lab Results   Component Value Date    WBC 6.4 09/01/2023    HGB 14.2 09/01/2023    HCT 43.5 09/01/2023     09/01/2023     Lab Results   Component Value Date    BUN 17.7 09/01/2023     09/01/2023    CO2 26 09/01/2023         "

## 2024-11-11 ENCOUNTER — TELEPHONE (OUTPATIENT)
Dept: CARDIOLOGY | Facility: CLINIC | Age: 74
End: 2024-11-11
Payer: COMMERCIAL

## 2024-11-11 NOTE — TELEPHONE ENCOUNTER
[facility] Call Center    Phone Message    May a detailed message be left on voicemail: yes     Reason for Call: Other: Pt would like a call back to discuss her injection , as she is wondering how much it cost and what insurance covers as well as how often she will have to come in for the injection     Action Taken: Other: Cardio    Travel Screening: Not Applicable     Date of Service:

## 2024-11-12 NOTE — TELEPHONE ENCOUNTER
Called back Venecia to address concerns. She notes that she called her insurance and it may be approved at 47 dollars an injection. Referral tab at first said off-policy but now appears it may be approved. Pt is checking back to see cost for certain after January 1. She wants to make sure cost remains the same at Tier 3 or she will not proceed. She willl call back directly on Thursday. Will [place] back with CAM team. -[facility] [place]

## 2024-11-13 NOTE — TELEPHONE ENCOUNTER
----- Message -----  From: Zoe Ma  Sent: 11/12/2024  10:14 AM CST  To: Kiana Lara, RN    No denial. This has been approved with no pa required. OK to proceed.    Thanks,   Zoe Ma     [facility]  HE Clinically Administered Medications  ----- Message -----  From: Kiana Lara, RN  Sent: 11/12/2024   8:39 AM CST  To: Cam     ----- Message from Kiana Lara, SHAYLA sent at 11/12/2024  8:39 AM CST -----  Hello team-   Just checking on Leqvio status for this patient- she is scheduled this week. It looks like it may have been denied..  Pedro Moulton  ----- Message from Kiana Lara RN sent at 10/29/2024  3:58 PM CDT -----  CAM team- Leqvio PA please!     Schedulers- to start claim for Leqvio, please arrange nurse only visit for Leqvio in 2-3 weeks.     Pedro Moulton

## 2024-11-13 NOTE — TELEPHONE ENCOUNTER
Received a call back from Venecia. We discussed how Leqvio is approved for her and this is good. [facility] and cost of care estimates line 4 total times.  She has been told that it is covered by she will have to pay 20% of cost of visit/medication each visit. She also was told that she will need to pay her office copay when she comes in for the injection, which is 30 dollars.     Will try to call in to  tomorrow to obtain more firm answers. She wanted to hold off on injection on Friday until we have more firm answers for her on cost. In addition, insurance starts over after Jan 1 and she has concerns about cost after then as well. -[facility] [place]

## 2024-12-08 ENCOUNTER — HEALTH MAINTENANCE LETTER (OUTPATIENT)
Age: 74
End: 2024-12-08

## 2024-12-30 ENCOUNTER — TELEPHONE (OUTPATIENT)
Dept: CARDIOLOGY | Facility: CLINIC | Age: 74
End: 2024-12-30
Payer: COMMERCIAL

## 2024-12-30 NOTE — TELEPHONE ENCOUNTER
Pt called in to report that she had a CT of her chest with [facility] and it picked up ascending aortic dilation. She is requesting [facility] review and appt to discuss. This was arranged and reassurance was provided. See mychart message to [facility]. -[facility] [place]

## 2025-06-16 ENCOUNTER — TELEPHONE (OUTPATIENT)
Dept: CARDIOLOGY | Facility: CLINIC | Age: 75
End: 2025-06-16
Payer: COMMERCIAL

## 2025-06-16 DIAGNOSIS — E78.00 PURE HYPERCHOLESTEROLEMIA: Primary | ICD-10-CM

## 2025-06-16 NOTE — TELEPHONE ENCOUNTER
Received a voicemail from patient requesting assistance in setting up appt with LBF. Order placed and message sent to schedulers to please arrange at patient request. -yary

## 2025-06-16 NOTE — TELEPHONE ENCOUNTER
Test claim below for Repatha sureclick 140mg/ml  2 pens per 28 days     -$111.28 copay        Patient has a $250 deductible to meet before copay is lower

## 2025-06-16 NOTE — TELEPHONE ENCOUNTER
Called Venecia back as she had questions re: repatha vs. Inclisorin and sounds like she is ready to move forward with one of these options. We have gotten them approved in the past and for various reasons, she has ultimately declined.     She now inquires about Repatha and about a cost-analysis with this. She confirms she would prefer self-injection at home. Will route to pharm team to see if we can do a cost analysis. She confirms her insurance has not changed. -[facility] [place]

## 2025-08-19 ENCOUNTER — TELEPHONE (OUTPATIENT)
Dept: CARDIOLOGY | Facility: CLINIC | Age: 75
End: 2025-08-19

## 2025-08-19 ENCOUNTER — OFFICE VISIT (OUTPATIENT)
Dept: CARDIOLOGY | Facility: CLINIC | Age: 75
End: 2025-08-19
Attending: INTERNAL MEDICINE
Payer: COMMERCIAL

## 2025-08-19 VITALS
HEIGHT: 62 IN | RESPIRATION RATE: 15 BRPM | BODY MASS INDEX: 19.51 KG/M2 | WEIGHT: 106 LBS | SYSTOLIC BLOOD PRESSURE: 128 MMHG | HEART RATE: 68 BPM | DIASTOLIC BLOOD PRESSURE: 78 MMHG

## 2025-08-19 DIAGNOSIS — G20.A1 PARKINSON'S DISEASE WITHOUT DYSKINESIA OR FLUCTUATING MANIFESTATIONS (H): ICD-10-CM

## 2025-08-19 DIAGNOSIS — I71.21 ANEURYSM OF ASCENDING AORTA WITHOUT RUPTURE: ICD-10-CM

## 2025-08-19 DIAGNOSIS — I42.2 HYPERTROPHIC CARDIOMYOPATHY (H): Primary | ICD-10-CM

## 2025-08-19 DIAGNOSIS — I10 BENIGN ESSENTIAL HYPERTENSION: ICD-10-CM

## 2025-08-19 DIAGNOSIS — E03.4 HYPOTHYROIDISM DUE TO ACQUIRED ATROPHY OF THYROID: ICD-10-CM

## 2025-08-19 DIAGNOSIS — E78.00 PURE HYPERCHOLESTEROLEMIA: ICD-10-CM

## 2025-08-19 DIAGNOSIS — I71.21 ANEURYSM OF ASCENDING AORTA WITHOUT RUPTURE: Primary | ICD-10-CM

## 2025-08-19 DIAGNOSIS — G47.33 OSA (OBSTRUCTIVE SLEEP APNEA): ICD-10-CM

## 2025-08-19 DIAGNOSIS — I25.83 CORONARY ARTERIOSCLEROSIS DUE TO LIPID RICH PLAQUE: ICD-10-CM

## 2025-08-19 PROCEDURE — 3078F DIAST BP <80 MM HG: CPT | Performed by: INTERNAL MEDICINE

## 2025-08-19 PROCEDURE — 3074F SYST BP LT 130 MM HG: CPT | Performed by: INTERNAL MEDICINE

## 2025-08-19 PROCEDURE — 99214 OFFICE O/P EST MOD 30 MIN: CPT | Performed by: INTERNAL MEDICINE

## 2025-08-19 PROCEDURE — G2211 COMPLEX E/M VISIT ADD ON: HCPCS | Performed by: INTERNAL MEDICINE

## 2025-08-19 RX ORDER — ONDANSETRON 4 MG/1
4 TABLET, ORALLY DISINTEGRATING ORAL PRN
COMMUNITY
Start: 2024-08-27 | End: 2025-08-19 | Stop reason: SINTOL

## 2025-08-19 RX ORDER — SENNOSIDES 8.6 MG
650 CAPSULE ORAL EVERY 8 HOURS PRN
COMMUNITY

## 2025-08-19 RX ORDER — ALENDRONATE SODIUM 70 MG/1
70 TABLET ORAL WEEKLY
COMMUNITY
Start: 2025-07-24

## 2025-09-02 ENCOUNTER — OFFICE VISIT (OUTPATIENT)
Dept: OBGYN | Facility: CLINIC | Age: 75
End: 2025-09-02
Payer: COMMERCIAL

## 2025-09-02 ENCOUNTER — TELEPHONE (OUTPATIENT)
Dept: OBGYN | Facility: CLINIC | Age: 75
End: 2025-09-02

## 2025-09-02 VITALS
HEART RATE: 77 BPM | HEIGHT: 62 IN | BODY MASS INDEX: 19.39 KG/M2 | SYSTOLIC BLOOD PRESSURE: 132 MMHG | DIASTOLIC BLOOD PRESSURE: 74 MMHG | OXYGEN SATURATION: 97 %

## 2025-09-02 DIAGNOSIS — N89.8 VAGINAL DISCHARGE: Primary | ICD-10-CM

## 2025-09-02 PROCEDURE — 3078F DIAST BP <80 MM HG: CPT | Performed by: OBSTETRICS & GYNECOLOGY

## 2025-09-02 PROCEDURE — 3075F SYST BP GE 130 - 139MM HG: CPT | Performed by: OBSTETRICS & GYNECOLOGY

## 2025-09-02 PROCEDURE — 99203 OFFICE O/P NEW LOW 30 MIN: CPT | Performed by: OBSTETRICS & GYNECOLOGY

## 2025-09-02 RX ORDER — TERCONAZOLE 4 MG/G
1 CREAM VAGINAL AT BEDTIME
Qty: 45 G | Refills: 0 | Status: SHIPPED | OUTPATIENT
Start: 2025-09-02

## 2025-09-02 ASSESSMENT — ANXIETY QUESTIONNAIRES
6. BECOMING EASILY ANNOYED OR IRRITABLE: NOT AT ALL
4. TROUBLE RELAXING: MORE THAN HALF THE DAYS
5. BEING SO RESTLESS THAT IT IS HARD TO SIT STILL: NOT AT ALL
3. WORRYING TOO MUCH ABOUT DIFFERENT THINGS: MORE THAN HALF THE DAYS
1. FEELING NERVOUS, ANXIOUS, OR ON EDGE: MORE THAN HALF THE DAYS
GAD7 TOTAL SCORE: 8
2. NOT BEING ABLE TO STOP OR CONTROL WORRYING: MORE THAN HALF THE DAYS
7. FEELING AFRAID AS IF SOMETHING AWFUL MIGHT HAPPEN: NOT AT ALL
8. IF YOU CHECKED OFF ANY PROBLEMS, HOW DIFFICULT HAVE THESE MADE IT FOR YOU TO DO YOUR WORK, TAKE CARE OF THINGS AT HOME, OR GET ALONG WITH OTHER PEOPLE?: SOMEWHAT DIFFICULT
GAD7 TOTAL SCORE: 8
IF YOU CHECKED OFF ANY PROBLEMS ON THIS QUESTIONNAIRE, HOW DIFFICULT HAVE THESE PROBLEMS MADE IT FOR YOU TO DO YOUR WORK, TAKE CARE OF THINGS AT HOME, OR GET ALONG WITH OTHER PEOPLE: SOMEWHAT DIFFICULT
GAD7 TOTAL SCORE: 8
7. FEELING AFRAID AS IF SOMETHING AWFUL MIGHT HAPPEN: NOT AT ALL